# Patient Record
Sex: FEMALE | Race: WHITE | NOT HISPANIC OR LATINO
[De-identification: names, ages, dates, MRNs, and addresses within clinical notes are randomized per-mention and may not be internally consistent; named-entity substitution may affect disease eponyms.]

---

## 2017-03-20 ENCOUNTER — RESULT REVIEW (OUTPATIENT)
Age: 37
End: 2017-03-20

## 2017-03-21 ENCOUNTER — APPOINTMENT (OUTPATIENT)
Dept: DERMATOLOGY | Facility: CLINIC | Age: 37
End: 2017-03-21

## 2017-03-21 ENCOUNTER — LABORATORY RESULT (OUTPATIENT)
Age: 37
End: 2017-03-21

## 2017-03-21 VITALS — DIASTOLIC BLOOD PRESSURE: 70 MMHG | SYSTOLIC BLOOD PRESSURE: 102 MMHG

## 2017-03-28 ENCOUNTER — APPOINTMENT (OUTPATIENT)
Dept: DERMATOLOGY | Facility: CLINIC | Age: 37
End: 2017-03-28

## 2017-03-28 DIAGNOSIS — D48.5 NEOPLASM OF UNCERTAIN BEHAVIOR OF SKIN: ICD-10-CM

## 2017-03-28 DIAGNOSIS — Z48.02 ENCOUNTER FOR REMOVAL OF SUTURES: ICD-10-CM

## 2017-03-28 RX ORDER — FLUTICASONE PROPIONATE 50 UG/1
50 SPRAY, METERED NASAL
Qty: 16 | Refills: 0 | Status: ACTIVE | COMMUNITY
Start: 2016-11-27

## 2017-03-28 RX ORDER — AZITHROMYCIN 250 MG/1
250 TABLET, FILM COATED ORAL
Qty: 6 | Refills: 0 | Status: ACTIVE | COMMUNITY
Start: 2016-11-27

## 2017-04-03 ENCOUNTER — MESSAGE (OUTPATIENT)
Age: 37
End: 2017-04-03

## 2018-04-24 ENCOUNTER — RESULT REVIEW (OUTPATIENT)
Age: 38
End: 2018-04-24

## 2018-05-08 ENCOUNTER — APPOINTMENT (OUTPATIENT)
Dept: SURGERY | Facility: CLINIC | Age: 38
End: 2018-05-08
Payer: COMMERCIAL

## 2018-05-08 VITALS
BODY MASS INDEX: 27.32 KG/M2 | SYSTOLIC BLOOD PRESSURE: 117 MMHG | WEIGHT: 170 LBS | HEIGHT: 66 IN | DIASTOLIC BLOOD PRESSURE: 80 MMHG | HEART RATE: 66 BPM

## 2018-05-08 DIAGNOSIS — Z80.0 FAMILY HISTORY OF MALIGNANT NEOPLASM OF DIGESTIVE ORGANS: ICD-10-CM

## 2018-05-08 DIAGNOSIS — Z80.7 FAMILY HISTORY OF OTHER MALIGNANT NEOPLASMS OF LYMPHOID, HEMATOPOIETIC AND RELATED TISSUES: ICD-10-CM

## 2018-05-08 PROCEDURE — 99244 OFF/OP CNSLTJ NEW/EST MOD 40: CPT

## 2018-05-17 ENCOUNTER — OUTPATIENT (OUTPATIENT)
Dept: OUTPATIENT SERVICES | Facility: HOSPITAL | Age: 38
LOS: 1 days | End: 2018-05-17

## 2018-05-17 VITALS
TEMPERATURE: 98 F | SYSTOLIC BLOOD PRESSURE: 130 MMHG | RESPIRATION RATE: 16 BRPM | HEIGHT: 66 IN | HEART RATE: 68 BPM | WEIGHT: 179.9 LBS | DIASTOLIC BLOOD PRESSURE: 86 MMHG

## 2018-05-17 DIAGNOSIS — Z98.89 OTHER SPECIFIED POSTPROCEDURAL STATES: Chronic | ICD-10-CM

## 2018-05-17 DIAGNOSIS — O02.1 MISSED ABORTION: Chronic | ICD-10-CM

## 2018-05-17 DIAGNOSIS — C73 MALIGNANT NEOPLASM OF THYROID GLAND: ICD-10-CM

## 2018-05-17 DIAGNOSIS — E04.1 NONTOXIC SINGLE THYROID NODULE: ICD-10-CM

## 2018-05-17 LAB
BUN SERPL-MCNC: 12 MG/DL — SIGNIFICANT CHANGE UP (ref 7–23)
CALCIUM SERPL-MCNC: 9.5 MG/DL — SIGNIFICANT CHANGE UP (ref 8.4–10.5)
CHLORIDE SERPL-SCNC: 99 MMOL/L — SIGNIFICANT CHANGE UP (ref 98–107)
CO2 SERPL-SCNC: 28 MMOL/L — SIGNIFICANT CHANGE UP (ref 22–31)
CREAT SERPL-MCNC: 0.63 MG/DL — SIGNIFICANT CHANGE UP (ref 0.5–1.3)
GLUCOSE SERPL-MCNC: 89 MG/DL — SIGNIFICANT CHANGE UP (ref 70–99)
HCT VFR BLD CALC: 36.6 % — SIGNIFICANT CHANGE UP (ref 34.5–45)
HGB BLD-MCNC: 12.3 G/DL — SIGNIFICANT CHANGE UP (ref 11.5–15.5)
MCHC RBC-ENTMCNC: 30.1 PG — SIGNIFICANT CHANGE UP (ref 27–34)
MCHC RBC-ENTMCNC: 33.6 % — SIGNIFICANT CHANGE UP (ref 32–36)
MCV RBC AUTO: 89.7 FL — SIGNIFICANT CHANGE UP (ref 80–100)
NRBC # FLD: 0 — SIGNIFICANT CHANGE UP
PLATELET # BLD AUTO: 321 K/UL — SIGNIFICANT CHANGE UP (ref 150–400)
PMV BLD: 10.3 FL — SIGNIFICANT CHANGE UP (ref 7–13)
POTASSIUM SERPL-MCNC: 4.2 MMOL/L — SIGNIFICANT CHANGE UP (ref 3.5–5.3)
POTASSIUM SERPL-SCNC: 4.2 MMOL/L — SIGNIFICANT CHANGE UP (ref 3.5–5.3)
RBC # BLD: 4.08 M/UL — SIGNIFICANT CHANGE UP (ref 3.8–5.2)
RBC # FLD: 12.3 % — SIGNIFICANT CHANGE UP (ref 10.3–14.5)
SODIUM SERPL-SCNC: 139 MMOL/L — SIGNIFICANT CHANGE UP (ref 135–145)
WBC # BLD: 11.01 K/UL — HIGH (ref 3.8–10.5)
WBC # FLD AUTO: 11.01 K/UL — HIGH (ref 3.8–10.5)

## 2018-05-17 RX ORDER — SODIUM CHLORIDE 9 MG/ML
1000 INJECTION, SOLUTION INTRAVENOUS
Qty: 0 | Refills: 0 | Status: DISCONTINUED | OUTPATIENT
Start: 2018-05-22 | End: 2018-05-23

## 2018-05-17 NOTE — H&P PST ADULT - ASSESSMENT
37 yrs old female with h/o thyroid nodule that was being followed, but recently had another sono that showed 2 nodules that were calcified and so had biopsy that showed papillary thyroid cancer. Pt presents for preop eval to have total thyroidectomy, paratracheal node dissection on 5/22/18.

## 2018-05-17 NOTE — H&P PST ADULT - NEGATIVE NEUROLOGICAL SYMPTOMS
no paresthesias/no headache/no weakness/no generalized seizures/no difficulty walking/no transient paralysis

## 2018-05-17 NOTE — H&P PST ADULT - PSH
H/O ovarian cystectomy  many yrs ago, from right ovary  Missed    and so had D&C  S/P laparoscopic cholecystectomy   for gall stones

## 2018-05-17 NOTE — H&P PST ADULT - PROBLEM SELECTOR PLAN 1
Scheduled to have total thyroidectomy, paratracheal node dissection on 5/22/18.   labs pending.  Preop instructions provided to pt.  Famotidine and chlorhexidine scrubs provided to pt with instructions

## 2018-05-17 NOTE — H&P PST ADULT - NEGATIVE GENERAL GENITOURINARY SYMPTOMS
no bladder infections/no hematuria/no renal colic/no flank pain R/no urinary hesitancy/no flank pain L

## 2018-05-17 NOTE — H&P PST ADULT - ENDOCRINE COMMENTS
h/o thyroid nodule and biopsy showed papillary thryoid cancer h/o thyroid nodule and biopsy showed papillary Thyroid cancer

## 2018-05-17 NOTE — H&P PST ADULT - NSANTHOSAYNRD_GEN_A_CORE
No. KATIE screening performed.  STOP BANG Legend: 0-2 = LOW Risk; 3-4 = INTERMEDIATE Risk; 5-8 = HIGH Risk/never tested

## 2018-05-21 ENCOUNTER — TRANSCRIPTION ENCOUNTER (OUTPATIENT)
Age: 38
End: 2018-05-21

## 2018-05-22 ENCOUNTER — RESULT REVIEW (OUTPATIENT)
Age: 38
End: 2018-05-22

## 2018-05-22 ENCOUNTER — INPATIENT (INPATIENT)
Facility: HOSPITAL | Age: 38
LOS: 0 days | Discharge: HOME | End: 2018-05-23
Attending: SPECIALIST | Admitting: SPECIALIST
Payer: COMMERCIAL

## 2018-05-22 ENCOUNTER — APPOINTMENT (OUTPATIENT)
Dept: SURGERY | Facility: HOSPITAL | Age: 38
End: 2018-05-22

## 2018-05-22 ENCOUNTER — OTHER (OUTPATIENT)
Age: 38
End: 2018-05-22

## 2018-05-22 ENCOUNTER — TRANSCRIPTION ENCOUNTER (OUTPATIENT)
Age: 38
End: 2018-05-22

## 2018-05-22 VITALS
DIASTOLIC BLOOD PRESSURE: 75 MMHG | SYSTOLIC BLOOD PRESSURE: 115 MMHG | OXYGEN SATURATION: 96 % | HEART RATE: 72 BPM | WEIGHT: 179.9 LBS | TEMPERATURE: 99 F | HEIGHT: 66 IN | RESPIRATION RATE: 16 BRPM

## 2018-05-22 DIAGNOSIS — C73 MALIGNANT NEOPLASM OF THYROID GLAND: ICD-10-CM

## 2018-05-22 DIAGNOSIS — Z98.89 OTHER SPECIFIED POSTPROCEDURAL STATES: Chronic | ICD-10-CM

## 2018-05-22 DIAGNOSIS — O02.1 MISSED ABORTION: Chronic | ICD-10-CM

## 2018-05-22 LAB
CALCIUM SERPL-MCNC: 8.6 MG/DL — SIGNIFICANT CHANGE UP (ref 8.4–10.5)
CALCIUM SERPL-MCNC: 9.1 MG/DL — SIGNIFICANT CHANGE UP (ref 8.4–10.5)
HCG UR QL: NEGATIVE — SIGNIFICANT CHANGE UP

## 2018-05-22 PROCEDURE — 88307 TISSUE EXAM BY PATHOLOGIST: CPT | Mod: 26

## 2018-05-22 RX ORDER — CALCIUM CARBONATE 500(1250)
1 TABLET ORAL
Qty: 0 | Refills: 0 | Status: DISCONTINUED | OUTPATIENT
Start: 2018-05-22 | End: 2018-05-23

## 2018-05-22 RX ORDER — LEVOTHYROXINE SODIUM 125 MCG
1 TABLET ORAL
Qty: 30 | Refills: 2 | OUTPATIENT
Start: 2018-05-22 | End: 2018-08-19

## 2018-05-22 RX ORDER — MIDAZOLAM HYDROCHLORIDE 1 MG/ML
2 INJECTION, SOLUTION INTRAMUSCULAR; INTRAVENOUS ONCE
Qty: 0 | Refills: 0 | Status: DISCONTINUED | OUTPATIENT
Start: 2018-05-22 | End: 2018-05-22

## 2018-05-22 RX ORDER — ALBUTEROL 90 UG/1
2 AEROSOL, METERED ORAL
Qty: 0 | Refills: 0 | COMMUNITY

## 2018-05-22 RX ORDER — ALPRAZOLAM 0.25 MG
0.5 TABLET ORAL ONCE
Qty: 0 | Refills: 0 | Status: DISCONTINUED | OUTPATIENT
Start: 2018-05-22 | End: 2018-05-22

## 2018-05-22 RX ORDER — ONDANSETRON 8 MG/1
4 TABLET, FILM COATED ORAL ONCE
Qty: 0 | Refills: 0 | Status: COMPLETED | OUTPATIENT
Start: 2018-05-22 | End: 2018-05-22

## 2018-05-22 RX ORDER — ACETAMINOPHEN 500 MG
2 TABLET ORAL
Qty: 0 | Refills: 0 | COMMUNITY
Start: 2018-05-22

## 2018-05-22 RX ORDER — ALBUTEROL 90 UG/1
2 AEROSOL, METERED ORAL EVERY 6 HOURS
Qty: 0 | Refills: 0 | Status: DISCONTINUED | OUTPATIENT
Start: 2018-05-22 | End: 2018-05-23

## 2018-05-22 RX ORDER — CETIRIZINE HYDROCHLORIDE 10 MG/1
1 TABLET ORAL
Qty: 0 | Refills: 0 | COMMUNITY

## 2018-05-22 RX ORDER — ACETAMINOPHEN 500 MG
650 TABLET ORAL EVERY 6 HOURS
Qty: 0 | Refills: 0 | Status: DISCONTINUED | OUTPATIENT
Start: 2018-05-22 | End: 2018-05-23

## 2018-05-22 RX ORDER — FENTANYL CITRATE 50 UG/ML
25 INJECTION INTRAVENOUS
Qty: 0 | Refills: 0 | Status: DISCONTINUED | OUTPATIENT
Start: 2018-05-22 | End: 2018-05-22

## 2018-05-22 RX ORDER — LEVOTHYROXINE SODIUM 125 MCG
112 TABLET ORAL DAILY
Qty: 0 | Refills: 0 | Status: DISCONTINUED | OUTPATIENT
Start: 2018-05-22 | End: 2018-05-23

## 2018-05-22 RX ORDER — LORATADINE 10 MG/1
10 TABLET ORAL DAILY
Qty: 0 | Refills: 0 | Status: DISCONTINUED | OUTPATIENT
Start: 2018-05-22 | End: 2018-05-23

## 2018-05-22 RX ORDER — FENTANYL CITRATE 50 UG/ML
50 INJECTION INTRAVENOUS
Qty: 0 | Refills: 0 | Status: DISCONTINUED | OUTPATIENT
Start: 2018-05-22 | End: 2018-05-22

## 2018-05-22 RX ORDER — CALCIUM CARBONATE 500(1250)
1 TABLET ORAL
Qty: 0 | Refills: 0 | COMMUNITY
Start: 2018-05-22

## 2018-05-22 RX ORDER — CALCIUM GLUCONATE 100 MG/ML
1 VIAL (ML) INTRAVENOUS ONCE
Qty: 0 | Refills: 0 | Status: COMPLETED | OUTPATIENT
Start: 2018-05-22 | End: 2018-05-22

## 2018-05-22 RX ORDER — FLUTICASONE PROPIONATE 50 MCG
2 SPRAY, SUSPENSION NASAL
Qty: 0 | Refills: 0 | COMMUNITY

## 2018-05-22 RX ORDER — OXYCODONE AND ACETAMINOPHEN 5; 325 MG/1; MG/1
1 TABLET ORAL EVERY 4 HOURS
Qty: 0 | Refills: 0 | Status: DISCONTINUED | OUTPATIENT
Start: 2018-05-22 | End: 2018-05-23

## 2018-05-22 RX ADMIN — SODIUM CHLORIDE 50 MILLILITER(S): 9 INJECTION, SOLUTION INTRAVENOUS at 14:56

## 2018-05-22 RX ADMIN — FENTANYL CITRATE 25 MICROGRAM(S): 50 INJECTION INTRAVENOUS at 09:56

## 2018-05-22 RX ADMIN — OXYCODONE AND ACETAMINOPHEN 1 TABLET(S): 5; 325 TABLET ORAL at 19:43

## 2018-05-22 RX ADMIN — MIDAZOLAM HYDROCHLORIDE 2 MILLIGRAM(S): 1 INJECTION, SOLUTION INTRAMUSCULAR; INTRAVENOUS at 10:38

## 2018-05-22 RX ADMIN — Medication 0.5 MILLIGRAM(S): at 14:57

## 2018-05-22 RX ADMIN — LORATADINE 10 MILLIGRAM(S): 10 TABLET ORAL at 17:46

## 2018-05-22 RX ADMIN — FENTANYL CITRATE 25 MICROGRAM(S): 50 INJECTION INTRAVENOUS at 10:20

## 2018-05-22 RX ADMIN — FENTANYL CITRATE 25 MICROGRAM(S): 50 INJECTION INTRAVENOUS at 10:40

## 2018-05-22 RX ADMIN — FENTANYL CITRATE 25 MICROGRAM(S): 50 INJECTION INTRAVENOUS at 10:25

## 2018-05-22 RX ADMIN — FENTANYL CITRATE 25 MICROGRAM(S): 50 INJECTION INTRAVENOUS at 10:10

## 2018-05-22 RX ADMIN — OXYCODONE AND ACETAMINOPHEN 1 TABLET(S): 5; 325 TABLET ORAL at 19:18

## 2018-05-22 RX ADMIN — Medication 1 TABLET(S): at 17:46

## 2018-05-22 RX ADMIN — OXYCODONE AND ACETAMINOPHEN 1 TABLET(S): 5; 325 TABLET ORAL at 14:25

## 2018-05-22 RX ADMIN — Medication 200 GRAM(S): at 09:48

## 2018-05-22 RX ADMIN — Medication 1 TABLET(S): at 13:48

## 2018-05-22 RX ADMIN — OXYCODONE AND ACETAMINOPHEN 1 TABLET(S): 5; 325 TABLET ORAL at 14:55

## 2018-05-22 RX ADMIN — ONDANSETRON 4 MILLIGRAM(S): 8 TABLET, FILM COATED ORAL at 11:23

## 2018-05-22 RX ADMIN — SODIUM CHLORIDE 50 MILLILITER(S): 9 INJECTION, SOLUTION INTRAVENOUS at 09:50

## 2018-05-22 NOTE — CHART NOTE - NSCHARTNOTEFT_GEN_A_CORE
[facility] GENERAL SURGERY POST-OP NOTE    SUBJECTIVE: Pt seen and evaluated at bedside. Resting comfortably in bed. Pain controlled. Denies nausea/vomiting, CP, palpitations, SOB, lightheaded, dizziness. Does complain of perioral and distal finger tingling/numbness    Objective:  Gen: NAD, AAOx3  HEENT: sensation intact bilateral face, neck, negative chvostek sign bilaterally  Pulm: b/l chest rise. No work on breathing, slightly increased rate18  Card: normal rate, RR, no m/r/g  Abd: soft, appropriately tender, ND. Dressings CDI.  Extremities: reflexes normal responsiveness    Vital Signs Last 24 Hrs  T(C): 36.4 (22 May 2018 13:45), Max: 37 (22 May 2018 06:45)  T(F): 97.5 (22 May 2018 13:45), Max: 98.6 (22 May 2018 06:45)  HR: 81 (22 May 2018 13:45) (72 - 95)  BP: 142/87 (22 May 2018 13:45) (115/75 - 146/75)  BP(mean): --  RR: 16 (22 May 2018 13:45) (12 - 30)  SpO2: 100% (22 May 2018 13:45) (96% - 100%)  I&O's Summary    22 May 2018 07:01  -  22 May 2018 14:51  --------------------------------------------------------  IN: 340 mL / OUT: 0 mL / NET: 340 mL      I&O's Detail    22 May 2018 07:01  -  22 May 2018 14:51  --------------------------------------------------------  IN:    lactated ringers.: 100 mL    Oral Fluid: 240 mL  Total IN: 340 mL    OUT:  Total OUT: 0 mL    Total NET: 340 mL      MEDICATIONS  (STANDING):  ALPRAZolam 0.5 milliGRAM(s) Oral once  calcium carbonate 500 mG (Tums) Chewable 1 Tablet(s) Chew four times a day  lactated ringers. 1000 milliLiter(s) (50 mL/Hr) IV Continuous <Continuous>  levothyroxine 112 MICROGram(s) Oral daily  loratadine 10 milliGRAM(s) Oral daily    MEDICATIONS  (PRN):  acetaminophen   Tablet. 650 milliGRAM(s) Oral every 6 hours PRN Moderate Pain (4 - 6)  ALBUTerol    90 MICROgram(s) HFA Inhaler 2 Puff(s) Inhalation every 6 hours PRN Shortness of Breath and/or Wheezing  oxyCODONE    5 mG/acetaminophen 325 mG 1 Tablet(s) Oral every 4 hours PRN Severe Pain (7 - 10)      LABS:      Ca    9.1      22 May 2018 11:17            RADIOLOGY & ADDITIONAL STUDIES:      A/P: 37y Female s/p total thyroid, first Ca level 9.1 recheck in lab now    - 0.5 xanax one time (takes PRN at home)  - f/u calcium level  - Pain control  - Strict I/O's  - F/U GI fxn  - OOB/DVT ppx  - AM labs

## 2018-05-22 NOTE — DISCHARGE NOTE ADULT - HOSPITAL COURSE
5/22/18 - total thyroidectomy, paratracheal node dissection 5/22/18 - total thyroidectomy, paratracheal node dissection. Percocet ordered for pain, contains tylenol, ensure no more than 4000 mg of tylenol taken per day (no more than 975 every 6 hours)

## 2018-05-22 NOTE — DISCHARGE NOTE ADULT - CARE PLAN
Principal Discharge DX:	Thyroid nodule  Secondary Diagnosis:	Missed  Principal Discharge DX:	Thyroid nodule  Goal:	return to daily living activity prior to surgery, postoperative recovery  Assessment and plan of treatment:	Follow-up in office with Dr. Hogan for final pathology and for post-op check  Secondary Diagnosis:	Missed

## 2018-05-22 NOTE — DISCHARGE NOTE ADULT - CONDITIONS AT DISCHARGE
Alert & oriented. Out of bed ambulating. Tolerating diet without nausea or vomiting. Neck steris are dry & intact. Slight numbness in lips but patient states it may be her anxiety. Voiding without difficulty. Vitals stable, afebrile. Understands all discharge instruction & will follow up with the MD. Time allowed for questions. Alert & oriented. Out of bed ambulating. Tolerating diet without nausea or vomiting. Neck steris are dry & intact. JAZMIN drain removed by MD. Slight numbness in lips but patient states it may be her anxiety, calcium level 8.6. Voiding without difficulty. Vitals stable, afebrile. Understands all discharge instruction & will follow up with the MD. Time allowed for questions.

## 2018-05-22 NOTE — BRIEF OPERATIVE NOTE - PROCEDURE
<<-----Click on this checkbox to enter Procedure Total thyroidectomy with paraglandular node dissection  05/22/2018    Active  DARCYL

## 2018-05-22 NOTE — DISCHARGE NOTE ADULT - PATIENT PORTAL LINK FT
You can access the [facility] Patient Portal, offered by [facility], by registering with the following website: [email or web address]

## 2018-05-22 NOTE — DISCHARGE NOTE ADULT - CARE PROVIDER_API CALL
Paulie Hogan), Plastic Surgery; Surgery  [address] [place] [address]  Phone: [phone]  Fax: [phone]

## 2018-05-22 NOTE — DISCHARGE NOTE ADULT - INSTRUCTIONS
Watch for signs of infection; redness, swelling, fever, chills or heat, report such symptoms to the MD. Drink 6-8 glasses of fluids daily to promote hydration. No heavy lifting, pulling or pushing heavy objects. Follow up with primary & surgical MD. Report numbness or tingling in lips or fingertips to the MD. Do not remove neck dressing. Shower as directed by MD, do not scrub site, allow water to run over incision & pat dry. Do not apply any lotions, ointments or powders to incision. Watch for signs of infection; redness, swelling, fever, chills or heat, report such symptoms to the MD. Drink 6-8 glasses of fluids daily to promote hydration. No heavy lifting, pulling or pushing heavy objects. Follow up with primary & surgical MD. Report numbness or tingling in lips or fingertips to the MD. Do not remove neck dressing. Shower as directed by MD, do not scrub site, allow water to run over incision & pat dry. Do not apply any lotions, ointments or powders to incision. No driving if taking percocet, causes drowsiness & constipation.

## 2018-05-22 NOTE — DISCHARGE NOTE ADULT - MEDICATION SUMMARY - MEDICATIONS TO TAKE
I will START or STAY ON the medications listed below when I get home from the hospital:    acetaminophen 325 mg oral tablet  -- 2 tab(s) by mouth every 6 hours, As needed, Moderate Pain (4 - 6)  -- Indication: For Malignant neoplasm of thyroid gland    calcium carbonate 500 mg (200 mg elemental calcium) oral tablet, chewable  -- 1 tab(s) by mouth 4 times a day  -- Indication: For Malignant neoplasm of thyroid gland    ZyrTEC 10 mg oral tablet  -- 1 tab(s) by mouth once a day  -- Indication: For Malignant neoplasm of thyroid gland    ProAir HFA 90 mcg/inh inhalation aerosol  -- 2 puff(s) inhaled 4 times a day, As Needed  -- Indication: For Malignant neoplasm of thyroid gland    fluticasone 50 mcg/inh nasal spray  -- 2 spray(s) into nose once a day  -- Indication: For Malignant neoplasm of thyroid gland    levothyroxine 112 mcg (0.112 mg) oral tablet  -- 1 tab(s) by mouth once a day   -- It is very important that you take or use this exactly as directed.  Do not skip doses or discontinue unless directed by your doctor.  Medication should be taken with plenty of water.  Some non-prescription drugs may aggravate your condition.  Read all labels carefully.  If a warning appears, check with your doctor before taking.  Take medication on an empty stomach 1 hour before or 2 to 3 hours after a meal unless otherwise directed by your doctor.    -- Indication: For Malignant neoplasm of thyroid gland

## 2018-05-22 NOTE — DISCHARGE NOTE ADULT - PLAN OF CARE
return to daily living activity prior to surgery, postoperative recovery Follow-up in office with Dr. Hogan for final pathology and for post-op check

## 2018-05-23 VITALS
RESPIRATION RATE: 18 BRPM | SYSTOLIC BLOOD PRESSURE: 117 MMHG | HEART RATE: 70 BPM | DIASTOLIC BLOOD PRESSURE: 62 MMHG | OXYGEN SATURATION: 100 % | TEMPERATURE: 98 F

## 2018-05-23 RX ORDER — ALPRAZOLAM 0.25 MG
0.5 TABLET ORAL ONCE
Qty: 0 | Refills: 0 | Status: DISCONTINUED | OUTPATIENT
Start: 2018-05-23 | End: 2018-05-23

## 2018-05-23 RX ORDER — OXYCODONE AND ACETAMINOPHEN 5; 325 MG/1; MG/1
5-325 TABLET ORAL
Qty: 20 | Refills: 0 | Status: ACTIVE | COMMUNITY
Start: 2018-05-23 | End: 1900-01-01

## 2018-05-23 RX ADMIN — SODIUM CHLORIDE 50 MILLILITER(S): 9 INJECTION, SOLUTION INTRAVENOUS at 10:47

## 2018-05-23 RX ADMIN — OXYCODONE AND ACETAMINOPHEN 1 TABLET(S): 5; 325 TABLET ORAL at 12:11

## 2018-05-23 RX ADMIN — OXYCODONE AND ACETAMINOPHEN 1 TABLET(S): 5; 325 TABLET ORAL at 12:45

## 2018-05-23 RX ADMIN — Medication 1 TABLET(S): at 06:54

## 2018-05-23 RX ADMIN — Medication 112 MICROGRAM(S): at 06:54

## 2018-05-23 RX ADMIN — Medication 0.5 MILLIGRAM(S): at 10:47

## 2018-05-23 RX ADMIN — OXYCODONE AND ACETAMINOPHEN 1 TABLET(S): 5; 325 TABLET ORAL at 06:38

## 2018-05-23 RX ADMIN — Medication 1 TABLET(S): at 12:11

## 2018-05-23 RX ADMIN — Medication 1 TABLET(S): at 01:46

## 2018-05-23 RX ADMIN — OXYCODONE AND ACETAMINOPHEN 1 TABLET(S): 5; 325 TABLET ORAL at 06:50

## 2018-05-23 RX ADMIN — Medication 0.5 MILLIGRAM(S): at 01:46

## 2018-05-23 NOTE — PROGRESS NOTE ADULT - SUBJECTIVE AND OBJECTIVE BOX
1 day s/p total thyroidectomy. afebrile. no collections. calcium level stable. negative chvostek's sign. now tolerating adequate po. JAZMIN removed. discharge home. f/u in office

## 2018-05-23 NOTE — PROGRESS NOTE ADULT - ASSESSMENT
- Calcium wnl  - Pain control  - Strict I/O's  - F/U GI fxn  - OOB/DVT ppx  - AM labs  - JAZMIN drain likely remove prior to discharge  - Discharge to home today    [id number]

## 2018-05-23 NOTE — PROGRESS NOTE ADULT - SUBJECTIVE AND OBJECTIVE BOX
S: Patient doing well, tingling improved after xanax yesterday, one additional dose overnight; denies fevers, chills, nausea, emesis, chest pain, SOB.    O: Vital Signs Last 24 Hrs  T(C): 36.5 (23 May 2018 06:51), Max: 36.9 (22 May 2018 17:11)  T(F): 97.7 (23 May 2018 06:51), Max: 98.4 (22 May 2018 17:11)  HR: 64 (23 May 2018 06:51) (64 - 95)  BP: 135/87 (23 May 2018 06:51) (108/69 - 146/75)  BP(mean): --  RR: 18 (23 May 2018 06:51) (12 - 30)  SpO2: 100% (23 May 2018 06:51) (95% - 100%)  I&O's Detail    22 May 2018 07:01  -  23 May 2018 07:00  --------------------------------------------------------  IN:    lactated ringers.: 900 mL    Oral Fluid: 900 mL  Total IN: 1800 mL    OUT:    Bulb: 95 mL    Voided: 1625 mL  Total OUT: 1720 mL    Total NET: 80 mL    General: alert and oriented, NAD  HEENT: incision CDI  Resp: airway patent, respirations unlabored  CVS: regular rate and rhythm  Abdomen: soft, nontender, nondistended  Extremities: no edema  Skin: warm, dry, appropriate color      Ca    8.6      22 May 2018 15:00

## 2018-05-25 ENCOUNTER — EMERGENCY (EMERGENCY)
Facility: HOSPITAL | Age: 38
LOS: 1 days | Discharge: HOME | End: 2018-05-25
Attending: EMERGENCY MEDICINE | Admitting: EMERGENCY MEDICINE
Payer: COMMERCIAL

## 2018-05-25 ENCOUNTER — OTHER (OUTPATIENT)
Age: 38
End: 2018-05-25

## 2018-05-25 VITALS
OXYGEN SATURATION: 95 % | DIASTOLIC BLOOD PRESSURE: 65 MMHG | HEART RATE: 62 BPM | RESPIRATION RATE: 14 BRPM | SYSTOLIC BLOOD PRESSURE: 112 MMHG | TEMPERATURE: 97 F

## 2018-05-25 VITALS
HEART RATE: 69 BPM | OXYGEN SATURATION: 100 % | SYSTOLIC BLOOD PRESSURE: 126 MMHG | TEMPERATURE: 98 F | RESPIRATION RATE: 16 BRPM | DIASTOLIC BLOOD PRESSURE: 71 MMHG

## 2018-05-25 DIAGNOSIS — Z98.89 OTHER SPECIFIED POSTPROCEDURAL STATES: Chronic | ICD-10-CM

## 2018-05-25 DIAGNOSIS — O02.1 MISSED ABORTION: Chronic | ICD-10-CM

## 2018-05-25 LAB
ALBUMIN SERPL ELPH-MCNC: 4.2 G/DL — SIGNIFICANT CHANGE UP (ref 3.3–5)
ALP SERPL-CCNC: 160 U/L — HIGH (ref 40–120)
ALT FLD-CCNC: 439 U/L — HIGH (ref 4–33)
APTT BLD: 32.7 SEC — SIGNIFICANT CHANGE UP (ref 27.5–37.4)
AST SERPL-CCNC: 256 U/L — HIGH (ref 4–32)
BASE EXCESS BLDV CALC-SCNC: 5.9 MMOL/L — SIGNIFICANT CHANGE UP
BASE EXCESS BLDV CALC-SCNC: 6 MMOL/L — SIGNIFICANT CHANGE UP
BASOPHILS # BLD AUTO: 0.04 K/UL — SIGNIFICANT CHANGE UP (ref 0–0.2)
BASOPHILS NFR BLD AUTO: 0.3 % — SIGNIFICANT CHANGE UP (ref 0–2)
BILIRUB SERPL-MCNC: 0.4 MG/DL — SIGNIFICANT CHANGE UP (ref 0.2–1.2)
BLOOD GAS VENOUS - CREATININE: 0.56 MG/DL — SIGNIFICANT CHANGE UP (ref 0.5–1.3)
BLOOD GAS VENOUS - CREATININE: 0.62 MG/DL — SIGNIFICANT CHANGE UP (ref 0.5–1.3)
BUN SERPL-MCNC: 11 MG/DL — SIGNIFICANT CHANGE UP (ref 7–23)
BUN SERPL-MCNC: 9 MG/DL — SIGNIFICANT CHANGE UP (ref 7–23)
CA-I BLD-SCNC: 0.88 MMOL/L — LOW (ref 1.03–1.23)
CA-I BLD-SCNC: 0.93 MMOL/L — LOW (ref 1.03–1.23)
CALCIUM SERPL-MCNC: 7.2 MG/DL — LOW (ref 8.4–10.5)
CALCIUM SERPL-MCNC: 7.2 MG/DL — LOW (ref 8.4–10.5)
CHLORIDE BLDV-SCNC: 101 MMOL/L — SIGNIFICANT CHANGE UP (ref 96–108)
CHLORIDE BLDV-SCNC: 98 MMOL/L — SIGNIFICANT CHANGE UP (ref 96–108)
CHLORIDE SERPL-SCNC: 93 MMOL/L — LOW (ref 98–107)
CHLORIDE SERPL-SCNC: 99 MMOL/L — SIGNIFICANT CHANGE UP (ref 98–107)
CK SERPL-CCNC: 83 U/L — SIGNIFICANT CHANGE UP (ref 25–170)
CO2 SERPL-SCNC: 29 MMOL/L — SIGNIFICANT CHANGE UP (ref 22–31)
CO2 SERPL-SCNC: 29 MMOL/L — SIGNIFICANT CHANGE UP (ref 22–31)
CREAT SERPL-MCNC: 0.6 MG/DL — SIGNIFICANT CHANGE UP (ref 0.5–1.3)
CREAT SERPL-MCNC: 0.62 MG/DL — SIGNIFICANT CHANGE UP (ref 0.5–1.3)
EOSINOPHIL # BLD AUTO: 0.1 K/UL — SIGNIFICANT CHANGE UP (ref 0–0.5)
EOSINOPHIL NFR BLD AUTO: 0.8 % — SIGNIFICANT CHANGE UP (ref 0–6)
GAS PNL BLDV: 135 MMOL/L — LOW (ref 136–146)
GAS PNL BLDV: 137 MMOL/L — SIGNIFICANT CHANGE UP (ref 136–146)
GLUCOSE BLDV-MCNC: 102 — HIGH (ref 70–99)
GLUCOSE BLDV-MCNC: 118 — HIGH (ref 70–99)
GLUCOSE SERPL-MCNC: 102 MG/DL — HIGH (ref 70–99)
GLUCOSE SERPL-MCNC: 108 MG/DL — HIGH (ref 70–99)
HCO3 BLDV-SCNC: 28 MMOL/L — HIGH (ref 20–27)
HCO3 BLDV-SCNC: 28 MMOL/L — HIGH (ref 20–27)
HCT VFR BLD CALC: 36.4 % — SIGNIFICANT CHANGE UP (ref 34.5–45)
HCT VFR BLDV CALC: 38.9 % — SIGNIFICANT CHANGE UP (ref 34.5–45)
HCT VFR BLDV CALC: 40 % — SIGNIFICANT CHANGE UP (ref 34.5–45)
HGB BLD-MCNC: 12.4 G/DL — SIGNIFICANT CHANGE UP (ref 11.5–15.5)
HGB BLDV-MCNC: 12.6 G/DL — SIGNIFICANT CHANGE UP (ref 11.5–15.5)
HGB BLDV-MCNC: 13 G/DL — SIGNIFICANT CHANGE UP (ref 11.5–15.5)
IMM GRANULOCYTES # BLD AUTO: 0.05 # — SIGNIFICANT CHANGE UP
IMM GRANULOCYTES NFR BLD AUTO: 0.4 % — SIGNIFICANT CHANGE UP (ref 0–1.5)
INR BLD: 1.09 — SIGNIFICANT CHANGE UP (ref 0.88–1.17)
LACTATE BLDV-MCNC: 0.8 MMOL/L — SIGNIFICANT CHANGE UP (ref 0.5–2)
LACTATE BLDV-MCNC: 2.8 MMOL/L — HIGH (ref 0.5–2)
LYMPHOCYTES # BLD AUTO: 2.43 K/UL — SIGNIFICANT CHANGE UP (ref 1–3.3)
LYMPHOCYTES # BLD AUTO: 20 % — SIGNIFICANT CHANGE UP (ref 13–44)
MCHC RBC-ENTMCNC: 29.4 PG — SIGNIFICANT CHANGE UP (ref 27–34)
MCHC RBC-ENTMCNC: 34.1 % — SIGNIFICANT CHANGE UP (ref 32–36)
MCV RBC AUTO: 86.3 FL — SIGNIFICANT CHANGE UP (ref 80–100)
MONOCYTES # BLD AUTO: 0.59 K/UL — SIGNIFICANT CHANGE UP (ref 0–0.9)
MONOCYTES NFR BLD AUTO: 4.9 % — SIGNIFICANT CHANGE UP (ref 2–14)
NEUTROPHILS # BLD AUTO: 8.95 K/UL — HIGH (ref 1.8–7.4)
NEUTROPHILS NFR BLD AUTO: 73.6 % — SIGNIFICANT CHANGE UP (ref 43–77)
NRBC # FLD: 0 — SIGNIFICANT CHANGE UP
PCO2 BLDV: 46 MMHG — SIGNIFICANT CHANGE UP (ref 41–51)
PCO2 BLDV: 53 MMHG — HIGH (ref 41–51)
PH BLDV: 7.38 PH — SIGNIFICANT CHANGE UP (ref 7.32–7.43)
PH BLDV: 7.43 PH — SIGNIFICANT CHANGE UP (ref 7.32–7.43)
PLATELET # BLD AUTO: 319 K/UL — SIGNIFICANT CHANGE UP (ref 150–400)
PMV BLD: 10.2 FL — SIGNIFICANT CHANGE UP (ref 7–13)
PO2 BLDV: 26 MMHG — LOW (ref 35–40)
PO2 BLDV: 33 MMHG — LOW (ref 35–40)
POTASSIUM BLDV-SCNC: 3.8 MMOL/L — SIGNIFICANT CHANGE UP (ref 3.4–4.5)
POTASSIUM BLDV-SCNC: 4.1 MMOL/L — SIGNIFICANT CHANGE UP (ref 3.4–4.5)
POTASSIUM SERPL-MCNC: 4.2 MMOL/L — SIGNIFICANT CHANGE UP (ref 3.5–5.3)
POTASSIUM SERPL-MCNC: 4.6 MMOL/L — SIGNIFICANT CHANGE UP (ref 3.5–5.3)
POTASSIUM SERPL-SCNC: 4.2 MMOL/L — SIGNIFICANT CHANGE UP (ref 3.5–5.3)
POTASSIUM SERPL-SCNC: 4.6 MMOL/L — SIGNIFICANT CHANGE UP (ref 3.5–5.3)
PROT SERPL-MCNC: 6.9 G/DL — SIGNIFICANT CHANGE UP (ref 6–8.3)
PROTHROM AB SERPL-ACNC: 12.5 SEC — SIGNIFICANT CHANGE UP (ref 9.8–13.1)
RBC # BLD: 4.22 M/UL — SIGNIFICANT CHANGE UP (ref 3.8–5.2)
RBC # FLD: 12.1 % — SIGNIFICANT CHANGE UP (ref 10.3–14.5)
SAO2 % BLDV: 41.8 % — LOW (ref 60–85)
SAO2 % BLDV: 53.2 % — LOW (ref 60–85)
SODIUM SERPL-SCNC: 137 MMOL/L — SIGNIFICANT CHANGE UP (ref 135–145)
SODIUM SERPL-SCNC: 140 MMOL/L — SIGNIFICANT CHANGE UP (ref 135–145)
SURGICAL PATHOLOGY STUDY: SIGNIFICANT CHANGE UP
T3 SERPL-MCNC: 98.7 NG/DL — SIGNIFICANT CHANGE UP (ref 80–200)
T4 AB SER-ACNC: 8.22 UG/DL — SIGNIFICANT CHANGE UP (ref 5.1–13)
TSH SERPL-MCNC: 1.07 UIU/ML — SIGNIFICANT CHANGE UP (ref 0.27–4.2)
WBC # BLD: 12.16 K/UL — HIGH (ref 3.8–10.5)
WBC # FLD AUTO: 12.16 K/UL — HIGH (ref 3.8–10.5)

## 2018-05-25 PROCEDURE — 99219: CPT

## 2018-05-25 RX ORDER — ALPRAZOLAM 0.25 MG
0.25 TABLET ORAL ONCE
Qty: 0 | Refills: 0 | Status: DISCONTINUED | OUTPATIENT
Start: 2018-05-25 | End: 2018-05-25

## 2018-05-25 RX ORDER — CALCITRIOL 0.5 UG/1
1 CAPSULE ORAL
Qty: 28 | Refills: 0 | OUTPATIENT
Start: 2018-05-25 | End: 2018-06-21

## 2018-05-25 RX ORDER — CALCIUM CARBONATE 500(1250)
2 TABLET ORAL EVERY 6 HOURS
Qty: 0 | Refills: 0 | Status: DISCONTINUED | OUTPATIENT
Start: 2018-05-25 | End: 2018-05-25

## 2018-05-25 RX ORDER — CALCIUM GLUCONATE 100 MG/ML
1 VIAL (ML) INTRAVENOUS ONCE
Qty: 0 | Refills: 0 | Status: COMPLETED | OUTPATIENT
Start: 2018-05-25 | End: 2018-05-25

## 2018-05-25 RX ORDER — CALCIUM CARBONATE 500(1250)
2 TABLET ORAL
Qty: 0 | Refills: 0 | Status: DISCONTINUED | OUTPATIENT
Start: 2018-05-25 | End: 2018-05-25

## 2018-05-25 RX ORDER — SODIUM CHLORIDE 9 MG/ML
1000 INJECTION INTRAMUSCULAR; INTRAVENOUS; SUBCUTANEOUS ONCE
Qty: 0 | Refills: 0 | Status: COMPLETED | OUTPATIENT
Start: 2018-05-25 | End: 2018-05-25

## 2018-05-25 RX ORDER — OXYCODONE AND ACETAMINOPHEN 5; 325 MG/1; MG/1
1 TABLET ORAL ONCE
Qty: 0 | Refills: 0 | Status: DISCONTINUED | OUTPATIENT
Start: 2018-05-25 | End: 2018-05-25

## 2018-05-25 RX ORDER — CALCITRIOL 0.5 UG/1
0.25 CAPSULE ORAL DAILY
Qty: 0 | Refills: 0 | Status: DISCONTINUED | OUTPATIENT
Start: 2018-05-25 | End: 2018-05-25

## 2018-05-25 RX ORDER — CALCIUM CARBONATE 500(1250)
2 TABLET ORAL EVERY 8 HOURS
Qty: 0 | Refills: 0 | Status: DISCONTINUED | OUTPATIENT
Start: 2018-05-25 | End: 2018-05-29

## 2018-05-25 RX ORDER — CALCIUM GLUCONATE 100 MG/ML
2 VIAL (ML) INTRAVENOUS ONCE
Qty: 0 | Refills: 0 | Status: COMPLETED | OUTPATIENT
Start: 2018-05-25 | End: 2018-05-25

## 2018-05-25 RX ORDER — CALCIUM CARBONATE 500(1250)
1 TABLET ORAL ONCE
Qty: 0 | Refills: 0 | Status: COMPLETED | OUTPATIENT
Start: 2018-05-25 | End: 2018-05-25

## 2018-05-25 RX ORDER — CALCITRIOL 0.5 UG/1
0.5 CAPSULE ORAL EVERY 12 HOURS
Qty: 0 | Refills: 0 | Status: CANCELLED | OUTPATIENT
Start: 2019-04-24 | End: 2018-05-29

## 2018-05-25 RX ORDER — ONDANSETRON 8 MG/1
4 TABLET, FILM COATED ORAL ONCE
Qty: 0 | Refills: 0 | Status: COMPLETED | OUTPATIENT
Start: 2018-05-25 | End: 2018-05-25

## 2018-05-25 RX ADMIN — Medication 1 TABLET(S): at 18:38

## 2018-05-25 RX ADMIN — OXYCODONE AND ACETAMINOPHEN 1 TABLET(S): 5; 325 TABLET ORAL at 15:19

## 2018-05-25 RX ADMIN — CALCITRIOL 0.25 MICROGRAM(S): 0.5 CAPSULE ORAL at 12:20

## 2018-05-25 RX ADMIN — Medication 200 GRAM(S): at 11:00

## 2018-05-25 RX ADMIN — SODIUM CHLORIDE 1000 MILLILITER(S): 9 INJECTION INTRAMUSCULAR; INTRAVENOUS; SUBCUTANEOUS at 12:21

## 2018-05-25 RX ADMIN — OXYCODONE AND ACETAMINOPHEN 1 TABLET(S): 5; 325 TABLET ORAL at 16:40

## 2018-05-25 RX ADMIN — ONDANSETRON 4 MILLIGRAM(S): 8 TABLET, FILM COATED ORAL at 12:21

## 2018-05-25 RX ADMIN — Medication 0.25 MILLIGRAM(S): at 12:20

## 2018-05-25 RX ADMIN — Medication 200 GRAM(S): at 12:08

## 2018-05-25 NOTE — ED CDU PROVIDER DISPOSITION NOTE - CLINICAL COURSE
pt s/p thyroidectomy  3 days ago with hypocalcemia, in cdu for calcium monitoring/supplementation  as well as pain control. Pt states she feels better, able to eat and wishes to go home now (initially discharged but the discharge was reversed).   at bedside, will take pt home. pt already has calcium supplements at home. pt s/p thyroidectomy  3 days ago with hypocalcemia, in cdu for calcium monitoring/supplementation  as well as pain control. Pt states she feels better, able to eat and wishes to go home now (initially discharged but the discharge was reversed).   at bedside, will take pt home. pt already has calcium supplements at home.  DISCHARGE NOTE-SUGAR SANCHEZ MD   If patient remains alert and oriented to person, place, date and situation and is appropriate.  If the patient denies any new or worsening physical complaints. If the patient feels better and wants to go home and all results of testing can be discussed with patient at bedside and copies of results can be provided to patient for discharge.  Patient understands that they develop new or worsening symptoms, to return to the ER immediately.  If the patient's vital signs remain stable then the patient can be safely discharged.    Discharge to home.    -Sugar Sanchez MD

## 2018-05-25 NOTE — ED ADULT TRIAGE NOTE - CHIEF COMPLAINT QUOTE
Pt 3 days s/p thyroidectomy, c/o bilateral arm stiffness and facial numbness since last night. Pt endorses difficulty swallowing/breathing. Sating 99% on room air, breathing even and unlabored.

## 2018-05-25 NOTE — ED PROVIDER NOTE - MEDICAL DECISION MAKING DETAILS
37 YOF PMH thyroid CA s/p thyroidectomy 3 days ago p/w hand cramping since this morning.  VSS WNL.  DDX hypokalemia, electrolyte abnormality, metabolic dyscrasia.  Exam consistent with hypocalcemia.  Given findings will treat empirically, surgery consult to Dr. Hogan, EKG, complete metabolic and hematologic evaluation, treat symptomatically and reassess.

## 2018-05-25 NOTE — ED CDU PROVIDER DISPOSITION NOTE - ATTENDING CONTRIBUTION TO CARE
SUGAR SKELTON MD: Reviewed and agree with the HPI as documented below:   36 y/o F nonsmoker PMHx thyroid nodule, gallstones, s/p thyroidectomy 3 days ago p/w tingling, numbness and cramping today.  Pt noted gradual onset numbness to face and muscle cramps to bilateral forearms, hands, arms, chest wall this morning.  Pt notes not having similar symptoms before.  Pt denies any fevers, chills, palpitations, lightheadedness, headache, visual/auditory disturbances.  Pt notes she has been compliant with calcium supplement since surgery with Dr. Hogan.  In ED, pt found to have hypocalcemia with serum calcium of 7.2 mg/dL.  Dr. Hogan was notified who recommended supplemental calcium and calcitriol.  Pt sent to CDU for calcium repletion, reassessment, and repeat labs.  Presently, pt notes significant improvement in muscle cramps and numbness.    SUGAR SERRANO, COSTA NOTE:      GEN: Patient is awake and alert and in no acute distress.    HEENT: Normocephalic/atraumatic.  Airway patent.  No oropharyngeal edema.  No stridor.  Auricles are normal.  Neck supple.  Surgical site c/d/i.  Sclera are non-icteric/Conjunctiva are not injected. EOMI.  RESP: Lungs CTAB, no wheeze, no rhonchi,  no rales.    CV: Heart is regular rate and rhythm.    ABD: Abdomen is soft, not distended +BS.  Non-tender to palpation.  No rebound/no guarding.  MSK: Back is nontender, no CVAT.  Moving all 4 extremities.     NEURO: Neurologically grossly intact.     PSYCH: Psychiatrically normal mood and affect.  No apparent risk to self or others.     DR. SERRANO, ATTENDING MD:    I performed a face to face bedside interview with patient regarding history of present illness, review of symptoms and past medical history. I completed an independent physical exam.  I have discussed patient's plan of care with the team of health care providers.   I agree with note as stated above, having amended the EMR as needed to reflect my findings. I have discussed the assessment and plan of care.  This includes during the time I functioned as the attending physician for this patient.

## 2018-05-25 NOTE — ED PROVIDER NOTE - PHYSICAL EXAMINATION
Justice carpal spasm on exam.  +Chvostek's and +Trousseau sign Justice carpal spasm on exam.  +Chvostek's and +Trousseau sign  ATTENDING PHYSICAL EXAM DR. EVANGELISTA ***GEN - NAD; well appearing; A+O x3 ***HEAD - NC/AT ***EYES/NOSE - PERRL, EOMI, mucous membranes moist, no discharge ***THROAT: Oral cavity and pharynx normal. No inflammation, swelling, exudate, or lesions. Surgical site clean and dry, no drainage  ***NECK: Neck supple, non-tender without lymphadenopathy, no masses, no thyromegaly.   ***PULMONARY - CTA b/l, symmetric breath sounds. ***CARDIAC -s1s2, RRR, no M,G,R  ***ABDOMEN - +BS, ND, NT, soft, no guarding, no rebound, no masses   ***BACK - no CVA tenderness, Normal  spine ***EXTREMITIES - symmetric pulses, 2+ dp, capillary refill < 2 seconds, no clubbing, no cyanosis, no edema, spasm of b/l fingers, +chvostek's ***SKIN - no rash or bruising   ***NEUROLOGIC - alert

## 2018-05-25 NOTE — ED CDU PROVIDER INITIAL DAY NOTE - NONTENDER LOCATION
left lower quadrant/periumbilical/suprapubic/left costovertebral angle/right upper quadrant/right lower quadrant/left upper quadrant/umbilical/right costovertebral angle

## 2018-05-25 NOTE — ED ADULT NURSE NOTE - OBJECTIVE STATEMENT
Pt A+OX3 S/P thyroidectomy on Monday and D/C'd Tuesday.  C/O numbness and tingling in miguel angel hands and feet since last night and awoke with facial heaviness and contracted hands.  MD notified.  Labs obtained and sent as ordered.  #20g SL R AC placed.  L hand PIV by EMS.  EKG done.  CM placed.  Spouse at bedside.  Appears anxious

## 2018-05-25 NOTE — ED PROVIDER NOTE - OBJECTIVE STATEMENT
37 YOF PMH thyroid CA s/p thyroidectomy 3 days ago p/w hand cramping since this morning.  Associated facial and hand tingling and stiffness.  No chest pain, SOB, fevers, chills, or other Sx.  Pt sent home on oral calcium supplements, levothyroxine, and percocet.

## 2018-05-25 NOTE — ED CDU PROVIDER INITIAL DAY NOTE - MEDICAL DECISION MAKING DETAILS
Pt is a 36 y/o F nonsmoker PMHx thyroid nodule, gallstones, s/p thyroidectomy 3 days ago p/w tingling, numbness and cramping today -- hypocalcemia s/p thyroidectomy -- replete calcium, recheck calcium

## 2018-05-25 NOTE — ED CDU PROVIDER INITIAL DAY NOTE - PROGRESS NOTE DETAILS
Repeat calcium stable.  Pt notes complete resolution of symptoms.  Pt requesting to go home.  As per main ED team, Dr. Alaniz and Dr. Kuhn, Dr. Hogan had originally wanted pt discharged from ED on Calcitriol 0.25 mcg QDay.  Pt given strict return precautions.  Pt medically stable for discharge.  Pt to follow up with PMD or Dr. Hogan in next few days to have repeat labs.  Pt noted to have transaminitis.  Hepatitis panel sent off.  Pt still without any subjective abdominal pain; abdomen soft, nontender, no guarding, no rigidity.  Pt medically stable for discharge. Pt notes recurrence of mild tingling to face.  Will keep pt in CDU overnight to replete calcium and recheck in AM. DISCHARGE NOTE-SUGAR SANCHEZ MD   If patient remains alert and oriented to person, place, date and situation and is appropriate.  If the patient denies any new or worsening physical complaints. If the patient feels better and wants to go home and all results of testing can be discussed with patient at bedside and copies of results can be provided to patient for discharge.  Patient understands that they develop new or worsening symptoms, to return to the ER immediately.  If the patient's vital signs remain stable then the patient can be safely discharged.    Discharge to home.    -Sugar Sanchez MD

## 2018-05-25 NOTE — ED CDU PROVIDER INITIAL DAY NOTE - ATTENDING CONTRIBUTION TO CARE
SUGAR SKELTON MD: Reviewed and agree with the HPI as documented below:   38 y/o F nonsmoker PMHx thyroid nodule, gallstones, s/p thyroidectomy 3 days ago p/w tingling, numbness and cramping today.  Pt noted gradual onset numbness to face and muscle cramps to bilateral forearms, hands, arms, chest wall this morning.  Pt notes not having similar symptoms before.  Pt denies any fevers, chills, palpitations, lightheadedness, headache, visual/auditory disturbances.  Pt notes she has been compliant with calcium supplement since surgery with Dr. Hogan.  In ED, pt found to have hypocalcemia with serum calcium of 7.2 mg/dL.  Dr. Hogan was notified who recommended supplemental calcium and calcitriol.  Pt sent to CDU for calcium repletion, reassessment, and repeat labs.  Presently, pt notes significant improvement in muscle cramps and numbness.    SUGAR SERRANO, COSTA NOTE:      GEN: Patient is awake and alert and in no acute distress.    HEENT: Normocephalic/atraumatic.  Airway patent.  No oropharyngeal edema.  No stridor.  Auricles are normal.  Neck supple.  Sclera are non-icteric/Conjunctiva are not injected. EOMI.  RESP: Lungs CTAB, no wheeze, no rhonchi,  no rales.    CV: Heart is regular rate and rhythm.    ABD: Abdomen is soft, not distended +BS.  Non-tender to palpation.  No rebound/no guarding.  MSK: Back is nontender, no CVAT.  Moving all 4 extremities.     NEURO: Neurologically grossly intact.     PSYCH: Psychiatrically normal mood and affect.  No apparent risk to self or others.     DR. SERRANO, ATTENDING MD:    I performed a face to face bedside interview with patient regarding history of present illness, review of symptoms and past medical history. I completed an independent physical exam.  I have discussed patient's plan of care with the team of health care providers.   I agree with note as stated above, having amended the EMR as needed to reflect my findings. I have discussed the assessment and plan of care.  This includes during the time I functioned as the attending physician for this patient. SUGAR SKELTON MD: Reviewed and agree with the HPI as documented below:   38 y/o F nonsmoker PMHx thyroid nodule, gallstones, s/p thyroidectomy 3 days ago p/w tingling, numbness and cramping today.  Pt noted gradual onset numbness to face and muscle cramps to bilateral forearms, hands, arms, chest wall this morning.  Pt notes not having similar symptoms before.  Pt denies any fevers, chills, palpitations, lightheadedness, headache, visual/auditory disturbances.  Pt notes she has been compliant with calcium supplement since surgery with Dr. Hogan.  In ED, pt found to have hypocalcemia with serum calcium of 7.2 mg/dL.  Dr. Hogan was notified who recommended supplemental calcium and calcitriol.  Pt sent to CDU for calcium repletion, reassessment, and repeat labs.  Presently, pt notes significant improvement in muscle cramps and numbness.    SUGAR SERRANO, COSTA NOTE:      GEN: Patient is awake and alert and in no acute distress.    HEENT: Normocephalic/atraumatic.  Airway patent.  No oropharyngeal edema.  No stridor.  Auricles are normal.  Neck supple.  Surgical site c/d/i.  Sclera are non-icteric/Conjunctiva are not injected. EOMI.  RESP: Lungs CTAB, no wheeze, no rhonchi,  no rales.    CV: Heart is regular rate and rhythm.    ABD: Abdomen is soft, not distended +BS.  Non-tender to palpation.  No rebound/no guarding.  MSK: Back is nontender, no CVAT.  Moving all 4 extremities.     NEURO: Neurologically grossly intact.     PSYCH: Psychiatrically normal mood and affect.  No apparent risk to self or others.     DR. SERRANO, ATTENDING MD:    I performed a face to face bedside interview with patient regarding history of present illness, review of symptoms and past medical history. I completed an independent physical exam.  I have discussed patient's plan of care with the team of health care providers.   I agree with note as stated above, having amended the EMR as needed to reflect my findings. I have discussed the assessment and plan of care.  This includes during the time I functioned as the attending physician for this patient.

## 2018-05-26 LAB
HAV IGM SER-ACNC: NONREACTIVE — SIGNIFICANT CHANGE UP
HBV CORE IGM SER-ACNC: NONREACTIVE — SIGNIFICANT CHANGE UP
HBV SURFACE AG SER-ACNC: NONREACTIVE — SIGNIFICANT CHANGE UP
HCV AB S/CO SERPL IA: 0.1 S/CO — SIGNIFICANT CHANGE UP
HCV AB SERPL-IMP: SIGNIFICANT CHANGE UP

## 2018-05-29 ENCOUNTER — RESULT REVIEW (OUTPATIENT)
Age: 38
End: 2018-05-29

## 2018-05-29 ENCOUNTER — APPOINTMENT (OUTPATIENT)
Dept: SURGERY | Facility: CLINIC | Age: 38
End: 2018-05-29
Payer: COMMERCIAL

## 2018-05-29 LAB
CALCIUM SERPL-MCNC: 10 MG/DL
CALCIUM SERPL-MCNC: 10 MG/DL
PARATHYROID HORMONE INTACT: 4 PG/ML

## 2018-05-29 PROCEDURE — 99024 POSTOP FOLLOW-UP VISIT: CPT

## 2018-05-29 PROCEDURE — 36415 COLL VENOUS BLD VENIPUNCTURE: CPT

## 2018-05-31 ENCOUNTER — RESULT REVIEW (OUTPATIENT)
Age: 38
End: 2018-05-31

## 2018-05-31 LAB — 25(OH)D3 SERPL-MCNC: 21.5 NG/ML

## 2018-06-05 ENCOUNTER — OTHER (OUTPATIENT)
Age: 38
End: 2018-06-05

## 2018-07-10 ENCOUNTER — APPOINTMENT (OUTPATIENT)
Dept: SURGERY | Facility: CLINIC | Age: 38
End: 2018-07-10
Payer: COMMERCIAL

## 2018-07-10 PROCEDURE — 99024 POSTOP FOLLOW-UP VISIT: CPT

## 2018-07-11 ENCOUNTER — OUTPATIENT (OUTPATIENT)
Dept: OUTPATIENT SERVICES | Facility: HOSPITAL | Age: 38
LOS: 1 days | End: 2018-07-11

## 2018-07-11 ENCOUNTER — APPOINTMENT (OUTPATIENT)
Dept: NUCLEAR MEDICINE | Facility: HOSPITAL | Age: 38
End: 2018-07-11
Payer: COMMERCIAL

## 2018-07-11 ENCOUNTER — APPOINTMENT (OUTPATIENT)
Dept: NUCLEAR MEDICINE | Facility: HOSPITAL | Age: 38
End: 2018-07-11

## 2018-07-11 DIAGNOSIS — Z98.89 OTHER SPECIFIED POSTPROCEDURAL STATES: Chronic | ICD-10-CM

## 2018-07-11 DIAGNOSIS — C73 MALIGNANT NEOPLASM OF THYROID GLAND: ICD-10-CM

## 2018-07-11 DIAGNOSIS — O02.1 MISSED ABORTION: Chronic | ICD-10-CM

## 2018-07-11 LAB — HCG SERPL-ACNC: < 5 MIU/ML — SIGNIFICANT CHANGE UP

## 2018-07-11 PROCEDURE — 96372 THER/PROPH/DIAG INJ SC/IM: CPT

## 2018-07-12 ENCOUNTER — APPOINTMENT (OUTPATIENT)
Dept: NUCLEAR MEDICINE | Facility: HOSPITAL | Age: 38
End: 2018-07-12

## 2018-07-12 PROCEDURE — 96372 THER/PROPH/DIAG INJ SC/IM: CPT

## 2018-07-13 ENCOUNTER — APPOINTMENT (OUTPATIENT)
Dept: NUCLEAR MEDICINE | Facility: HOSPITAL | Age: 38
End: 2018-07-13

## 2018-07-13 PROCEDURE — 79005 NUCLEAR RX ORAL ADMIN: CPT | Mod: 26

## 2018-07-15 ENCOUNTER — APPOINTMENT (OUTPATIENT)
Dept: NUCLEAR MEDICINE | Facility: HOSPITAL | Age: 38
End: 2018-07-15

## 2018-07-17 PROBLEM — O02.1 MISSED ABORTION: Chronic | Status: ACTIVE | Noted: 2018-05-17

## 2018-07-17 PROBLEM — K80.20 CALCULUS OF GALLBLADDER WITHOUT CHOLECYSTITIS WITHOUT OBSTRUCTION: Chronic | Status: ACTIVE | Noted: 2018-05-17

## 2018-07-17 PROBLEM — E04.1 NONTOXIC SINGLE THYROID NODULE: Chronic | Status: ACTIVE | Noted: 2018-05-17

## 2018-07-20 ENCOUNTER — APPOINTMENT (OUTPATIENT)
Dept: NUCLEAR MEDICINE | Facility: HOSPITAL | Age: 38
End: 2018-07-20
Payer: COMMERCIAL

## 2018-07-20 ENCOUNTER — OUTPATIENT (OUTPATIENT)
Dept: OUTPATIENT SERVICES | Facility: HOSPITAL | Age: 38
LOS: 1 days | End: 2018-07-20

## 2018-07-20 DIAGNOSIS — C73 MALIGNANT NEOPLASM OF THYROID GLAND: ICD-10-CM

## 2018-07-20 DIAGNOSIS — Z98.89 OTHER SPECIFIED POSTPROCEDURAL STATES: Chronic | ICD-10-CM

## 2018-07-20 DIAGNOSIS — O02.1 MISSED ABORTION: Chronic | ICD-10-CM

## 2018-07-20 PROCEDURE — 78018 THYROID MET IMAGING BODY: CPT | Mod: 26

## 2018-11-29 ENCOUNTER — LABORATORY RESULT (OUTPATIENT)
Age: 38
End: 2018-11-29

## 2018-11-29 ENCOUNTER — APPOINTMENT (OUTPATIENT)
Dept: SURGERY | Facility: CLINIC | Age: 38
End: 2018-11-29
Payer: COMMERCIAL

## 2018-11-29 DIAGNOSIS — C73 MALIGNANT NEOPLASM OF THYROID GLAND: ICD-10-CM

## 2018-11-29 PROCEDURE — 99213 OFFICE O/P EST LOW 20 MIN: CPT

## 2018-11-29 PROCEDURE — 36415 COLL VENOUS BLD VENIPUNCTURE: CPT

## 2018-12-02 ENCOUNTER — RESULT REVIEW (OUTPATIENT)
Age: 38
End: 2018-12-02

## 2018-12-02 LAB
T3 SERPL-MCNC: 101 NG/DL
T4 FREE SERPL-MCNC: 1.3 NG/DL
THYROGLOB AB SERPL-ACNC: 210 IU/ML
THYROGLOB SERPL-MCNC: 0.52 NG/ML
TSH SERPL-ACNC: 0.42 UIU/ML

## 2018-12-04 ENCOUNTER — RESULT REVIEW (OUTPATIENT)
Age: 38
End: 2018-12-04

## 2018-12-08 NOTE — ASU PREOP CHECKLIST - WEIGHT IN LBS
Nursing Note by Migdalia Oneill CMA at 04/05/17 10:44 AM     Author:  Migdalia Oneill CMA Service:  (none) Author Type:  Certified Medical Assistant     Filed:  04/05/17 10:44 AM Encounter Date:  4/5/2017 Status:  Signed     :  Migdalia Oneill CMA (Certified Medical Assistant)            If provider orders tests or biopsy at today's visit, patient would like to be contacted with results at [phone] (home) and it is[DJ1.1T] ok[DJ1.1M] to leave a detailed message on voice mail or with family member.  If medications are ordered at today's visit, the pharmacy name/location patient would like them to be sent to is [facility]  [address]DJ1.1T]           Revision History        User Key Date/Time User Provider Type Action    > DJ1.1 04/05/17 10:44 AM Migdalia Oneill CMA Certified Medical Assistant Sign    M - Manual, T - Template             179.8

## 2018-12-28 NOTE — H&P PST ADULT - RS GEN PE MLT RESP DETAILS PC
13 Hour(s) 48 Minute(s)
no rhonchi/no rales/clear to auscultation bilaterally/respirations non-labored/airway patent/no wheezes

## 2019-05-30 ENCOUNTER — APPOINTMENT (OUTPATIENT)
Dept: SURGERY | Facility: CLINIC | Age: 39
End: 2019-05-30

## 2019-11-18 ENCOUNTER — OUTPATIENT (OUTPATIENT)
Dept: OUTPATIENT SERVICES | Facility: HOSPITAL | Age: 39
LOS: 1 days | End: 2019-11-18

## 2019-11-18 ENCOUNTER — APPOINTMENT (OUTPATIENT)
Dept: NUCLEAR MEDICINE | Facility: HOSPITAL | Age: 39
End: 2019-11-18
Payer: COMMERCIAL

## 2019-11-18 DIAGNOSIS — C73 MALIGNANT NEOPLASM OF THYROID GLAND: ICD-10-CM

## 2019-11-18 DIAGNOSIS — O02.1 MISSED ABORTION: Chronic | ICD-10-CM

## 2019-11-18 DIAGNOSIS — Z98.89 OTHER SPECIFIED POSTPROCEDURAL STATES: Chronic | ICD-10-CM

## 2019-11-18 LAB — HCG SERPL-ACNC: < 5 MIU/ML — SIGNIFICANT CHANGE UP

## 2019-11-19 ENCOUNTER — APPOINTMENT (OUTPATIENT)
Dept: NUCLEAR MEDICINE | Facility: HOSPITAL | Age: 39
End: 2019-11-19

## 2019-11-20 ENCOUNTER — APPOINTMENT (OUTPATIENT)
Dept: NUCLEAR MEDICINE | Facility: HOSPITAL | Age: 39
End: 2019-11-20

## 2019-11-21 ENCOUNTER — APPOINTMENT (OUTPATIENT)
Dept: NUCLEAR MEDICINE | Facility: HOSPITAL | Age: 39
End: 2019-11-21

## 2019-11-22 ENCOUNTER — APPOINTMENT (OUTPATIENT)
Dept: NUCLEAR MEDICINE | Facility: HOSPITAL | Age: 39
End: 2019-11-22

## 2019-11-22 PROCEDURE — 78016 THYROID MET IMAGING/STUDIES: CPT | Mod: 26

## 2019-11-22 PROCEDURE — 78018 THYROID MET IMAGING BODY: CPT | Mod: 26

## 2019-11-22 PROCEDURE — 78020 THYROID MET UPTAKE: CPT | Mod: 26

## 2019-11-23 ENCOUNTER — APPOINTMENT (OUTPATIENT)
Dept: NUCLEAR MEDICINE | Facility: HOSPITAL | Age: 39
End: 2019-11-23

## 2019-11-24 ENCOUNTER — APPOINTMENT (OUTPATIENT)
Dept: NUCLEAR MEDICINE | Facility: HOSPITAL | Age: 39
End: 2019-11-24

## 2020-10-02 ENCOUNTER — TRANSCRIPTION ENCOUNTER (OUTPATIENT)
Age: 40
End: 2020-10-02

## 2020-10-02 ENCOUNTER — EMERGENCY (EMERGENCY)
Facility: HOSPITAL | Age: 40
LOS: 1 days | Discharge: HOME | End: 2020-10-02
Attending: EMERGENCY MEDICINE | Admitting: EMERGENCY MEDICINE
Payer: COMMERCIAL

## 2020-10-02 VITALS
RESPIRATION RATE: 18 BRPM | HEIGHT: 66 IN | DIASTOLIC BLOOD PRESSURE: 71 MMHG | OXYGEN SATURATION: 100 % | HEART RATE: 75 BPM | SYSTOLIC BLOOD PRESSURE: 112 MMHG | TEMPERATURE: 97 F

## 2020-10-02 VITALS
DIASTOLIC BLOOD PRESSURE: 77 MMHG | OXYGEN SATURATION: 99 % | SYSTOLIC BLOOD PRESSURE: 139 MMHG | RESPIRATION RATE: 18 BRPM | HEART RATE: 64 BPM | TEMPERATURE: 98 F

## 2020-10-02 DIAGNOSIS — Z98.89 OTHER SPECIFIED POSTPROCEDURAL STATES: Chronic | ICD-10-CM

## 2020-10-02 DIAGNOSIS — O02.1 MISSED ABORTION: Chronic | ICD-10-CM

## 2020-10-02 LAB
ALBUMIN SERPL ELPH-MCNC: 4 G/DL — SIGNIFICANT CHANGE UP (ref 3.3–5)
ALBUMIN SERPL ELPH-MCNC: 4.1 G/DL — SIGNIFICANT CHANGE UP (ref 3.3–5)
ALBUMIN SERPL ELPH-MCNC: 4.6 G/DL — SIGNIFICANT CHANGE UP (ref 3.3–5)
ALP SERPL-CCNC: 107 U/L — SIGNIFICANT CHANGE UP (ref 40–120)
ALP SERPL-CCNC: 78 U/L — SIGNIFICANT CHANGE UP (ref 40–120)
ALP SERPL-CCNC: 95 U/L — SIGNIFICANT CHANGE UP (ref 40–120)
ALT FLD-CCNC: 312 U/L — HIGH (ref 4–33)
ALT FLD-CCNC: 358 U/L — HIGH (ref 4–33)
ALT FLD-CCNC: 37 U/L — HIGH (ref 4–33)
ANION GAP SERPL CALC-SCNC: 11 MMO/L — SIGNIFICANT CHANGE UP (ref 7–14)
ANION GAP SERPL CALC-SCNC: 12 MMO/L — SIGNIFICANT CHANGE UP (ref 7–14)
ANION GAP SERPL CALC-SCNC: 13 MMO/L — SIGNIFICANT CHANGE UP (ref 7–14)
APPEARANCE UR: SIGNIFICANT CHANGE UP
APTT BLD: 24.9 SEC — LOW (ref 27–36.3)
AST SERPL-CCNC: 353 U/L — HIGH (ref 4–32)
AST SERPL-CCNC: 408 U/L — HIGH (ref 4–32)
AST SERPL-CCNC: 56 U/L — HIGH (ref 4–32)
BACTERIA # UR AUTO: SIGNIFICANT CHANGE UP
BASOPHILS # BLD AUTO: 0.03 K/UL — SIGNIFICANT CHANGE UP (ref 0–0.2)
BASOPHILS # BLD AUTO: 0.05 K/UL — SIGNIFICANT CHANGE UP (ref 0–0.2)
BASOPHILS NFR BLD AUTO: 0.3 % — SIGNIFICANT CHANGE UP (ref 0–2)
BASOPHILS NFR BLD AUTO: 0.3 % — SIGNIFICANT CHANGE UP (ref 0–2)
BILIRUB SERPL-MCNC: 0.5 MG/DL — SIGNIFICANT CHANGE UP (ref 0.2–1.2)
BILIRUB SERPL-MCNC: 0.7 MG/DL — SIGNIFICANT CHANGE UP (ref 0.2–1.2)
BILIRUB SERPL-MCNC: 0.9 MG/DL — SIGNIFICANT CHANGE UP (ref 0.2–1.2)
BILIRUB UR-MCNC: NEGATIVE — SIGNIFICANT CHANGE UP
BLOOD UR QL VISUAL: NEGATIVE — SIGNIFICANT CHANGE UP
BUN SERPL-MCNC: 16 MG/DL — SIGNIFICANT CHANGE UP (ref 7–23)
BUN SERPL-MCNC: 9 MG/DL — SIGNIFICANT CHANGE UP (ref 7–23)
BUN SERPL-MCNC: 9 MG/DL — SIGNIFICANT CHANGE UP (ref 7–23)
CALCIUM SERPL-MCNC: 8.3 MG/DL — LOW (ref 8.4–10.5)
CALCIUM SERPL-MCNC: 8.6 MG/DL — SIGNIFICANT CHANGE UP (ref 8.4–10.5)
CALCIUM SERPL-MCNC: 9.4 MG/DL — SIGNIFICANT CHANGE UP (ref 8.4–10.5)
CHLORIDE SERPL-SCNC: 100 MMOL/L — SIGNIFICANT CHANGE UP (ref 98–107)
CHLORIDE SERPL-SCNC: 102 MMOL/L — SIGNIFICANT CHANGE UP (ref 98–107)
CHLORIDE SERPL-SCNC: 102 MMOL/L — SIGNIFICANT CHANGE UP (ref 98–107)
CO2 SERPL-SCNC: 23 MMOL/L — SIGNIFICANT CHANGE UP (ref 22–31)
CO2 SERPL-SCNC: 23 MMOL/L — SIGNIFICANT CHANGE UP (ref 22–31)
CO2 SERPL-SCNC: 25 MMOL/L — SIGNIFICANT CHANGE UP (ref 22–31)
COLOR SPEC: YELLOW — SIGNIFICANT CHANGE UP
CREAT SERPL-MCNC: 0.54 MG/DL — SIGNIFICANT CHANGE UP (ref 0.5–1.3)
CREAT SERPL-MCNC: 0.58 MG/DL — SIGNIFICANT CHANGE UP (ref 0.5–1.3)
CREAT SERPL-MCNC: 0.62 MG/DL — SIGNIFICANT CHANGE UP (ref 0.5–1.3)
EOSINOPHIL # BLD AUTO: 0.06 K/UL — SIGNIFICANT CHANGE UP (ref 0–0.5)
EOSINOPHIL # BLD AUTO: 0.08 K/UL — SIGNIFICANT CHANGE UP (ref 0–0.5)
EOSINOPHIL NFR BLD AUTO: 0.4 % — SIGNIFICANT CHANGE UP (ref 0–6)
EOSINOPHIL NFR BLD AUTO: 0.9 % — SIGNIFICANT CHANGE UP (ref 0–6)
GLUCOSE SERPL-MCNC: 127 MG/DL — HIGH (ref 70–99)
GLUCOSE SERPL-MCNC: 94 MG/DL — SIGNIFICANT CHANGE UP (ref 70–99)
GLUCOSE SERPL-MCNC: 95 MG/DL — SIGNIFICANT CHANGE UP (ref 70–99)
GLUCOSE UR-MCNC: NEGATIVE — SIGNIFICANT CHANGE UP
HCG UR-SCNC: NEGATIVE — SIGNIFICANT CHANGE UP
HCT VFR BLD CALC: 34.5 % — SIGNIFICANT CHANGE UP (ref 34.5–45)
HCT VFR BLD CALC: 39.2 % — SIGNIFICANT CHANGE UP (ref 34.5–45)
HGB BLD-MCNC: 11.5 G/DL — SIGNIFICANT CHANGE UP (ref 11.5–15.5)
HGB BLD-MCNC: 12.8 G/DL — SIGNIFICANT CHANGE UP (ref 11.5–15.5)
HYALINE CASTS # UR AUTO: HIGH
IMM GRANULOCYTES NFR BLD AUTO: 0.2 % — SIGNIFICANT CHANGE UP (ref 0–1.5)
IMM GRANULOCYTES NFR BLD AUTO: 0.4 % — SIGNIFICANT CHANGE UP (ref 0–1.5)
INR BLD: 1.14 — SIGNIFICANT CHANGE UP (ref 0.88–1.16)
KETONES UR-MCNC: SIGNIFICANT CHANGE UP
LEUKOCYTE ESTERASE UR-ACNC: SIGNIFICANT CHANGE UP
LIDOCAIN IGE QN: 30.5 U/L — SIGNIFICANT CHANGE UP (ref 7–60)
LYMPHOCYTES # BLD AUTO: 12 % — LOW (ref 13–44)
LYMPHOCYTES # BLD AUTO: 2.03 K/UL — SIGNIFICANT CHANGE UP (ref 1–3.3)
LYMPHOCYTES # BLD AUTO: 2.28 K/UL — SIGNIFICANT CHANGE UP (ref 1–3.3)
LYMPHOCYTES # BLD AUTO: 26.1 % — SIGNIFICANT CHANGE UP (ref 13–44)
MCHC RBC-ENTMCNC: 28.9 PG — SIGNIFICANT CHANGE UP (ref 27–34)
MCHC RBC-ENTMCNC: 29.4 PG — SIGNIFICANT CHANGE UP (ref 27–34)
MCHC RBC-ENTMCNC: 32.7 % — SIGNIFICANT CHANGE UP (ref 32–36)
MCHC RBC-ENTMCNC: 33.3 % — SIGNIFICANT CHANGE UP (ref 32–36)
MCV RBC AUTO: 86.7 FL — SIGNIFICANT CHANGE UP (ref 80–100)
MCV RBC AUTO: 90.1 FL — SIGNIFICANT CHANGE UP (ref 80–100)
MONOCYTES # BLD AUTO: 0.54 K/UL — SIGNIFICANT CHANGE UP (ref 0–0.9)
MONOCYTES # BLD AUTO: 0.86 K/UL — SIGNIFICANT CHANGE UP (ref 0–0.9)
MONOCYTES NFR BLD AUTO: 5.1 % — SIGNIFICANT CHANGE UP (ref 2–14)
MONOCYTES NFR BLD AUTO: 6.2 % — SIGNIFICANT CHANGE UP (ref 2–14)
NEUTROPHILS # BLD AUTO: 13.79 K/UL — HIGH (ref 1.8–7.4)
NEUTROPHILS # BLD AUTO: 5.78 K/UL — SIGNIFICANT CHANGE UP (ref 1.8–7.4)
NEUTROPHILS NFR BLD AUTO: 66.3 % — SIGNIFICANT CHANGE UP (ref 43–77)
NEUTROPHILS NFR BLD AUTO: 81.8 % — HIGH (ref 43–77)
NITRITE UR-MCNC: NEGATIVE — SIGNIFICANT CHANGE UP
NRBC # FLD: 0 K/UL — SIGNIFICANT CHANGE UP (ref 0–0)
NRBC # FLD: 0 K/UL — SIGNIFICANT CHANGE UP (ref 0–0)
PH UR: 6 — SIGNIFICANT CHANGE UP (ref 5–8)
PLATELET # BLD AUTO: 258 K/UL — SIGNIFICANT CHANGE UP (ref 150–400)
PLATELET # BLD AUTO: 294 K/UL — SIGNIFICANT CHANGE UP (ref 150–400)
PMV BLD: 10.3 FL — SIGNIFICANT CHANGE UP (ref 7–13)
PMV BLD: 9.9 FL — SIGNIFICANT CHANGE UP (ref 7–13)
POTASSIUM SERPL-MCNC: 3.6 MMOL/L — SIGNIFICANT CHANGE UP (ref 3.5–5.3)
POTASSIUM SERPL-MCNC: 3.8 MMOL/L — SIGNIFICANT CHANGE UP (ref 3.5–5.3)
POTASSIUM SERPL-MCNC: 3.9 MMOL/L — SIGNIFICANT CHANGE UP (ref 3.5–5.3)
POTASSIUM SERPL-SCNC: 3.6 MMOL/L — SIGNIFICANT CHANGE UP (ref 3.5–5.3)
POTASSIUM SERPL-SCNC: 3.8 MMOL/L — SIGNIFICANT CHANGE UP (ref 3.5–5.3)
POTASSIUM SERPL-SCNC: 3.9 MMOL/L — SIGNIFICANT CHANGE UP (ref 3.5–5.3)
PROT SERPL-MCNC: 6 G/DL — SIGNIFICANT CHANGE UP (ref 6–8.3)
PROT SERPL-MCNC: 6.4 G/DL — SIGNIFICANT CHANGE UP (ref 6–8.3)
PROT SERPL-MCNC: 7.2 G/DL — SIGNIFICANT CHANGE UP (ref 6–8.3)
PROT UR-MCNC: 20 — SIGNIFICANT CHANGE UP
PROTHROM AB SERPL-ACNC: 12.9 SEC — SIGNIFICANT CHANGE UP (ref 10.6–13.6)
RBC # BLD: 3.98 M/UL — SIGNIFICANT CHANGE UP (ref 3.8–5.2)
RBC # BLD: 4.35 M/UL — SIGNIFICANT CHANGE UP (ref 3.8–5.2)
RBC # FLD: 12.4 % — SIGNIFICANT CHANGE UP (ref 10.3–14.5)
RBC # FLD: 12.5 % — SIGNIFICANT CHANGE UP (ref 10.3–14.5)
RBC CASTS # UR COMP ASSIST: SIGNIFICANT CHANGE UP (ref 0–?)
SARS-COV-2 RNA SPEC QL NAA+PROBE: SIGNIFICANT CHANGE UP
SODIUM SERPL-SCNC: 136 MMOL/L — SIGNIFICANT CHANGE UP (ref 135–145)
SODIUM SERPL-SCNC: 137 MMOL/L — SIGNIFICANT CHANGE UP (ref 135–145)
SODIUM SERPL-SCNC: 138 MMOL/L — SIGNIFICANT CHANGE UP (ref 135–145)
SP GR SPEC: 1.02 — SIGNIFICANT CHANGE UP (ref 1–1.04)
SP GR UR: 1.02 — SIGNIFICANT CHANGE UP (ref 1–1.04)
SQUAMOUS # UR AUTO: SIGNIFICANT CHANGE UP
TROPONIN T, HIGH SENSITIVITY: < 6 NG/L — SIGNIFICANT CHANGE UP (ref ?–14)
TROPONIN T, HIGH SENSITIVITY: < 6 NG/L — SIGNIFICANT CHANGE UP (ref ?–14)
UROBILINOGEN FLD QL: NORMAL — SIGNIFICANT CHANGE UP
WBC # BLD: 16.85 K/UL — HIGH (ref 3.8–10.5)
WBC # BLD: 8.73 K/UL — SIGNIFICANT CHANGE UP (ref 3.8–10.5)
WBC # FLD AUTO: 16.85 K/UL — HIGH (ref 3.8–10.5)
WBC # FLD AUTO: 8.73 K/UL — SIGNIFICANT CHANGE UP (ref 3.8–10.5)
WBC UR QL: HIGH (ref 0–?)

## 2020-10-02 PROCEDURE — 71046 X-RAY EXAM CHEST 2 VIEWS: CPT | Mod: 26

## 2020-10-02 PROCEDURE — 76705 ECHO EXAM OF ABDOMEN: CPT | Mod: 26

## 2020-10-02 PROCEDURE — 99218: CPT

## 2020-10-02 PROCEDURE — 74177 CT ABD & PELVIS W/CONTRAST: CPT | Mod: 26

## 2020-10-02 RX ORDER — FAMOTIDINE 10 MG/ML
20 INJECTION INTRAVENOUS ONCE
Refills: 0 | Status: COMPLETED | OUTPATIENT
Start: 2020-10-02 | End: 2020-10-02

## 2020-10-02 RX ORDER — SIMETHICONE 80 MG/1
80 TABLET, CHEWABLE ORAL ONCE
Refills: 0 | Status: COMPLETED | OUTPATIENT
Start: 2020-10-02 | End: 2020-10-02

## 2020-10-02 RX ORDER — MORPHINE SULFATE 50 MG/1
6 CAPSULE, EXTENDED RELEASE ORAL ONCE
Refills: 0 | Status: DISCONTINUED | OUTPATIENT
Start: 2020-10-02 | End: 2020-10-02

## 2020-10-02 RX ORDER — ACETAMINOPHEN 500 MG
650 TABLET ORAL ONCE
Refills: 0 | Status: COMPLETED | OUTPATIENT
Start: 2020-10-02 | End: 2020-10-02

## 2020-10-02 RX ORDER — MORPHINE SULFATE 50 MG/1
4 CAPSULE, EXTENDED RELEASE ORAL ONCE
Refills: 0 | Status: DISCONTINUED | OUTPATIENT
Start: 2020-10-02 | End: 2020-10-02

## 2020-10-02 RX ORDER — LEVOTHYROXINE SODIUM 125 MCG
112 TABLET ORAL DAILY
Refills: 0 | Status: DISCONTINUED | OUTPATIENT
Start: 2020-10-02 | End: 2020-10-05

## 2020-10-02 RX ORDER — SODIUM CHLORIDE 9 MG/ML
1000 INJECTION INTRAMUSCULAR; INTRAVENOUS; SUBCUTANEOUS ONCE
Refills: 0 | Status: COMPLETED | OUTPATIENT
Start: 2020-10-02 | End: 2020-10-02

## 2020-10-02 RX ORDER — ONDANSETRON 8 MG/1
4 TABLET, FILM COATED ORAL ONCE
Refills: 0 | Status: COMPLETED | OUTPATIENT
Start: 2020-10-02 | End: 2020-10-02

## 2020-10-02 RX ORDER — PANTOPRAZOLE SODIUM 20 MG/1
40 TABLET, DELAYED RELEASE ORAL ONCE
Refills: 0 | Status: COMPLETED | OUTPATIENT
Start: 2020-10-02 | End: 2020-10-02

## 2020-10-02 RX ORDER — LIDOCAINE 4 G/100G
10 CREAM TOPICAL ONCE
Refills: 0 | Status: COMPLETED | OUTPATIENT
Start: 2020-10-02 | End: 2020-10-02

## 2020-10-02 RX ORDER — SUCRALFATE 1 G
1 TABLET ORAL ONCE
Refills: 0 | Status: COMPLETED | OUTPATIENT
Start: 2020-10-02 | End: 2020-10-02

## 2020-10-02 RX ADMIN — Medication 112 MICROGRAM(S): at 15:21

## 2020-10-02 RX ADMIN — SODIUM CHLORIDE 1000 MILLILITER(S): 9 INJECTION INTRAMUSCULAR; INTRAVENOUS; SUBCUTANEOUS at 03:53

## 2020-10-02 RX ADMIN — SIMETHICONE 80 MILLIGRAM(S): 80 TABLET, CHEWABLE ORAL at 07:39

## 2020-10-02 RX ADMIN — MORPHINE SULFATE 6 MILLIGRAM(S): 50 CAPSULE, EXTENDED RELEASE ORAL at 04:51

## 2020-10-02 RX ADMIN — FAMOTIDINE 20 MILLIGRAM(S): 10 INJECTION INTRAVENOUS at 02:28

## 2020-10-02 RX ADMIN — MORPHINE SULFATE 4 MILLIGRAM(S): 50 CAPSULE, EXTENDED RELEASE ORAL at 03:24

## 2020-10-02 RX ADMIN — PANTOPRAZOLE SODIUM 40 MILLIGRAM(S): 20 TABLET, DELAYED RELEASE ORAL at 08:50

## 2020-10-02 RX ADMIN — Medication 650 MILLIGRAM(S): at 07:39

## 2020-10-02 RX ADMIN — ONDANSETRON 4 MILLIGRAM(S): 8 TABLET, FILM COATED ORAL at 02:47

## 2020-10-02 RX ADMIN — MORPHINE SULFATE 6 MILLIGRAM(S): 50 CAPSULE, EXTENDED RELEASE ORAL at 04:39

## 2020-10-02 RX ADMIN — LIDOCAINE 10 MILLILITER(S): 4 CREAM TOPICAL at 02:28

## 2020-10-02 RX ADMIN — Medication 30 MILLILITER(S): at 02:27

## 2020-10-02 RX ADMIN — SODIUM CHLORIDE 1000 MILLILITER(S): 9 INJECTION INTRAMUSCULAR; INTRAVENOUS; SUBCUTANEOUS at 02:25

## 2020-10-02 RX ADMIN — Medication 1 GRAM(S): at 08:52

## 2020-10-02 RX ADMIN — MORPHINE SULFATE 4 MILLIGRAM(S): 50 CAPSULE, EXTENDED RELEASE ORAL at 03:42

## 2020-10-02 NOTE — ED ADULT NURSE REASSESSMENT NOTE - NS ED NURSE REASSESS COMMENT FT1
Assumed care of pt at 0830.  Pt sleeping easily arousable in NAD, pt medicated as per order.  Pt awaiting Covid result for CDU admission.  Pt aware of plan, will cont to monitor.

## 2020-10-02 NOTE — ED CDU PROVIDER DISPOSITION NOTE - CLINICAL COURSE
Curtis: Awoke w/ severe epig. pain and nausea. Now resolved. LFTs increased from initial to second set, and now decreasing slightly. Liver synthetic fxn intact. Suspect she passed a retained gallstone (had lap cholecystectomy 5-6 yrs ago) or had Sphincter of Oddi dysfunction. F/u GI.

## 2020-10-02 NOTE — ED PROVIDER NOTE - ATTENDING CONTRIBUTION TO CARE
HPI: 38 yo F with Past Medical History significant for thyroid CA s/p thyroidectomy on synthroid s/p cholecystectomy 6 years ago that presents with epigastric pain radiating up towards chest as well as midback associated with nausea and vomiting (non bloody) tonight. Went to bed while watching TV, woke up 2 hours prior to arrival with pressure like 10/10 epigastric pain radiating towards chest, felt heavieness, started having several bouts N/V, started being diaphoretic and left arm tingling.  concerned for MI so told pt to come to ED for eval. pt denies any abnormal ingestions, has been well otherwise, 5 year old son was sick with fever x 24 hours 4 days ago and now well, otherwise no other sick contacts. Social smoker, LMP 1 month ago, no urinary symptoms, headache, vision/hearing changes, jaw pain.  EXAM: NAD, heart RRR, lungs ctab, abd soft, neg murphys, neg mcburneys, no rebound/guarding, no CVAT, skin without rash, no BLE edema.   MDM: pt with thyroid CA in past s/p thyroidectomy in remission and GB removal that presents with sudden onset epigastric pain radiating up towards chest with multiple bouts N/V but no diarrhea concerned for MI. Pt appears well now and still slightly nauseas but no longer vomiting, benign abd exam. Concern for viral gastro vs pancreatitis vs PUD/gastritis. Unlikely ACS vs PE vs AAA given no risk factors for these. VS are WNL. Will obtain labs, imaging, provide med and IVF and reassess.

## 2020-10-02 NOTE — ED ADULT NURSE NOTE - OBJECTIVE STATEMENT
Patient is a 39y female, A&Ox4, ambulatory, steady gait observed, history of thyroidectomy, complaining of waking up in the middle of the night with chest/epigastric pain, associated with a couple episodes of vomiting. At this time patient is nauseas, not actively vomiting. also reports bilateral hand paresthesia. Placed on cardiac monitor, sinus rhythm, IV initiated 20 gauge right antecubital. labs drawn and sent. Stretcher in lowest position, wheels locked, side rails up as per protocol. Call bell in reach. Will continue to monitor. Awaiting radiology results. Patient is a 39y female, A&Ox4, ambulatory, steady gait observed, history of thyroidectomy, complaining of waking up in the middle of the night with chest/epigastric pain, associated with a couple episodes of vomiting. At this time patient is nauseas, not actively vomiting. also reports bilateral hand paresthesia. Placed on cardiac monitor, sinus rhythm, IV initiated 20 gauge right antecubital. labs drawn and sent. Darkened lights and warm blanket provided for comfort measures. Stretcher in lowest position, wheels locked, side rails up as per protocol. Call bell in reach. Will continue to monitor. Awaiting radiology results.

## 2020-10-02 NOTE — ED PROVIDER NOTE - OBJECTIVE STATEMENT
40 yo f pmh thyroid CA s/p thyroidectomy, s/p cholecystectomy, pw cp. pt reports was in usual state of health last night. woke up in middle of the night with chest/epigastric pain radiating to sides and to throat, intermittent, 10/10, no known exacerbating/remitting sx,  no prior hx of similar sx. reports nausea and b/l hand paresthesias w/ sx. denies sob, cough, congestion, ha, f/c, gu sx.

## 2020-10-02 NOTE — ED CDU PROVIDER INITIAL DAY NOTE - OBJECTIVE STATEMENT
40 yo f pmh thyroid CA s/p thyroidectomy, s/p cholecystectomy, pw cp. pt reports was in usual state of health last night. woke up in middle of the night with chest/epigastric pain radiating to sides and to throat, intermittent, 10/10, no known exacerbating/remitting sx,  no prior hx of similar sx. reports nausea and b/l hand paresthesias w/ sx. denies sob, cough, congestion, ha, f/c, gu sx.    CDU VLADIMIR Kohler: Agree with above. Pt pain now better controlled. US and rpt labs pending.

## 2020-10-02 NOTE — ED CDU PROVIDER INITIAL DAY NOTE - ATTENDING CONTRIBUTION TO CARE
I performed a face-to-face evaluation of the patient and performed a history and physical examination. I agree with the history and physical examination.    Curtis: H/o lap cholecystectomy 5-6 yrs ago. Awoke w/ severe epig. pain and nausea. Now resolved. Takes Motrin frequently. Suspect she passed a retained gallstone (or had Sphincter of Oddi dysfunction) or has early PUD 2/2 NSAIDs. Get RUQ US. Repeat LFTs.

## 2020-10-02 NOTE — ED CDU PROVIDER DISPOSITION NOTE - NS_EDPROVIDERDISPOUSERTYPE_ED_A_ED
Addended by: Roya Jsoe on: 10/25/2018 06:23 PM     Modules accepted: Orders
Attending Attestation (For Attendings USE Only)...

## 2020-10-02 NOTE — ED PROVIDER NOTE - CLINICAL SUMMARY MEDICAL DECISION MAKING FREE TEXT BOX
ciara pgy3: 40 yo f pw chest/epigastric pain, acute, radiating to sides. pt arrives uncomfortable appearing, hds, vss. perc negative. no hx cad in self/family. recent physical and pcp check up reported normal. low suspicion acs, aortic catastrophe, retained gallstone. possible dyspepsia vs pancreatitis. labs, imaging, sx control, reassess.

## 2020-10-02 NOTE — ED CDU PROVIDER DISPOSITION NOTE - ATTENDING CONTRIBUTION TO CARE
I performed a face-to-face evaluation of the patient and performed a history and physical examination. I agree with the history and physical examination.    Awoke w/ severe epig. pain and nausea. Now resolved. LFTs increased from initial to second set, and now decreasing slightly. Liver synthetic fxn intact. Suspect she passed a retained gallstone (had lap cholecystectomy 5-6 yrs ago) or had Sphincter of Oddi dysfunction. F/u GI.

## 2020-10-02 NOTE — ED PROVIDER NOTE - NS ED ROS FT
GENERAL: No fever or chills, //             EYES: no change in vision, //             HEENT: no trouble swallowing or speaking, //             CARDIAC: no chest pain, //              PULMONARY: cp, //             GI: nausea //             : No changes in urination,  //            SKIN: no rashes,  //            NEURO: no headache,  //             MSK: No joint pain otherwise as HPI or negative. ~Adrian Dye DO PGY3

## 2020-10-02 NOTE — ED CDU PROVIDER INITIAL DAY NOTE - MEDICAL DECISION MAKING DETAILS
38 yo f pmh thyroid CA s/p thyroidectomy, s/p cholecystectomy, pw cp/ abdominal pain  trops neg. ekg nsr. ct with no acute abnormalities. US and rpt labs pending. pain controlled.

## 2020-10-02 NOTE — ED CDU PROVIDER INITIAL DAY NOTE - PROGRESS NOTE DETAILS
Pt in no acute pain, dull discomfort, but tolerable with toleration of food. Pt feels much improved from original presentation. Pt had elevation in LFTs, rpt with comparable values. Will have patient follow with GI.

## 2020-10-02 NOTE — ED ADULT NURSE NOTE - NSIMPLEMENTINTERV_GEN_ALL_ED
Implemented All Universal Safety Interventions:  [place] to call system. Call bell, personal items and telephone within reach. Instruct patient to call for assistance. Room bathroom lighting operational. Non-slip footwear when patient is off stretcher. Physically safe environment: no spills, clutter or unnecessary equipment. Stretcher in lowest position, wheels locked, appropriate side rails in place.

## 2020-11-09 ENCOUNTER — RESULT REVIEW (OUTPATIENT)
Age: 40
End: 2020-11-09

## 2020-12-16 ENCOUNTER — TRANSCRIPTION ENCOUNTER (OUTPATIENT)
Age: 40
End: 2020-12-16

## 2022-01-13 ENCOUNTER — APPOINTMENT (OUTPATIENT)
Dept: ULTRASOUND IMAGING | Facility: IMAGING CENTER | Age: 42
End: 2022-01-13

## 2022-01-13 ENCOUNTER — APPOINTMENT (OUTPATIENT)
Dept: MAMMOGRAPHY | Facility: IMAGING CENTER | Age: 42
End: 2022-01-13

## 2022-05-03 NOTE — ED CDU PROVIDER DISPOSITION NOTE - NSFOLLOWUPINSTRUCTIONS_ED_ALL_ED_FT
Ante Partum High Risk Pregnancy Note    PATIENT: Leticia Bui  MRN: [id number]      Patient admitted for aspiration pneumonia, SOB CP and PTL states she does have chest pain, denies SOB, denies contractions. Overall states feeling much better, states minimal back pain  Sitting up in chair eating  LOS:    Vitals: Temp (24hrs), Av.9 °F (37.2 °C), Min:97.8 °F (36.6 °C), Max:99.9 °F (37.7 °C)   Patient Vitals for the past 24 hrs:   BP   22 0836 116/77   22 0732 127/80   22 0610 117/62   22 0301 129/69   22 0201 130/72   22 0101 125/67   22 0001 121/66   22 2301 126/70   22 2201 (!) 113/59   22 2101 125/61   22 1801 (!) 145/71   22 1704 133/68   22 1601 138/72   22 1529 133/66   22 1417 139/78   22 1402 127/75   22 1332 132/69   22 1317 129/65   22 1302 132/62   22 1233 136/71   22 1217 (!) 147/76   22 1204 (!) 167/78   22 1148 134/77   22 1132 (!) 141/75   22 1119 (!) 150/78   22 1031 (!) 143/74   22 1017 (!) 179/98   22 1000 (!) 147/74   22 0945 (!) 162/85   22 0931 131/73   22 0915 (!) 160/88       I&O:   No intake/output data recorded.  1901 -  0700  In: 2100 [P.O.:1800; I.V.:300]  Out: 4250 [Urine:4250]  Pulse oximetry assessment   96% at rest on room air (if 88% or less, skip next steps)         Exam:  Patient without distress. Abdomen: soft, non-tender               Fundus: soft and non tender               Fundal Height: 33 cm               Right Upper Quadrant: non-tender               Perineum: No sign of blood or amniotic fluid               Lower Extremities: 2+ pitting edema                        Clonus: absent               NST Procedure Note    Patient: Leticia Bui               Sex: female          DOA: 2022       YOB: 1999      Age:  24 y. o. LOS:  LOS: 1 day     MRN: [id number]                    CSN: [id number]      Estimated Gestational Age:33w1d     Indication for NST: Pre-Term labor evaluation    NST: 10x10    Fetal Vital Signs:  Mode: External  Fetal Heart Rate:160  Fetal Activity: Present  Variability: Moderate 6-25 bpm  Decelerations:No  Accelerations:Yes  FHR Interpretations:Category II    Non-Stress Test: obgyn fetal nst findings: Cat 1  15 x 15 not reactive but overall reassuring  Plan scan today with MFM can add BPP     Uterine Activity:  Mode: External  Frequency (min): none       Signed by:Letha Marquez DO  5/3/2022 9:14 AM               Results for orders placed or performed during the hospital encounter of 05/01/22   1.  CULTURE, URINE    Specimen: Clean catch; Urine    URINE   Result Value Ref Range    Special Requests: NO SPECIAL REQUESTS      Culture result:        10,000 to 50,000 COLONIES/mL NORMAL SKIN SAVANNAH ISOLATED   2. COVID-19 RAPID TEST   Result Value Ref Range    Specimen source NASAL      COVID-19 rapid test Not detected NOTD     3. RESPIRATORY VIRUS PANEL W/COVID-19, PCR    Specimen: Nasopharyngeal   Result Value Ref Range    Adenovirus NOT DETECTED NOTDET      Coronavirus 229E NOT DETECTED NOTDET      Coronavirus HKU1 NOT DETECTED NOTDET      Coronavirus CVNL63 NOT DETECTED NOTDET      Coronavirus OC43 NOT DETECTED NOTDET      SARS-CoV-2, PCR NOT DETECTED NOTDET      Metapneumovirus NOT DETECTED NOTDET      Rhinovirus and Enterovirus NOT DETECTED NOTDET      Influenza A NOT DETECTED NOTDET      Influenza B NOT DETECTED NOTDET      Parainfluenza 1 NOT DETECTED NOTDET      Parainfluenza 2 NOT DETECTED NOTDET      Parainfluenza 3 NOT DETECTED NOTDET      Parainfluenza virus 4 NOT DETECTED NOTDET      RSV by PCR NOT DETECTED NOTDET      B. parapertussis, PCR NOT DETECTED NOTDET      Bordetella pertussis - PCR NOT DETECTED NOTDET      Chlamydophila pneumoniae DNA, QL, PCR NOT DETECTED NOTDET      Mycoplasma pneumoniae DNA, QL, PCR NOT DETECTED NOTDET     4. CBC W/O DIFF   Result Value Ref Range    WBC 16.2 (H) 4.3 - 11.1 K/uL    RBC 3.51 (L) 4.05 - 5.2 M/uL    HGB 9.4 (L) 11.7 - 15.4 g/dL    HCT 29.5 (L) 35.8 - 46.3 %    MCV 84.0 79.6 - 97.8 FL    MCH 26.8 26.1 - 32.9 PG    MCHC 31.9 31.4 - 35.0 g/dL    RDW 13.1 11.9 - 14.6 %    PLATELET 611 754 - 463 K/uL    MPV 11.5 9.4 - 12.3 FL    ABSOLUTE NRBC 0.04 0.0 - 0.2 K/uL   5. BUN   Result Value Ref Range    BUN 8 6 - 23 MG/DL   6. CREATININE   Result Value Ref Range    Creatinine 0.69 0.6 - 1.0 MG/DL   7. HEPATIC FUNCTION PANEL   Result Value Ref Range    Protein, total 6.8 6.3 - 8.2 g/dL    Albumin 2.6 (L) 3.5 - 5.0 g/dL    Globulin 4.2 (H) 2.3 - 3.5 g/dL    A-G Ratio 0.6 (L) 1.2 - 3.5      Bilirubin, total 0.2 0.2 - 1.1 MG/DL    Bilirubin, direct 0.1 <0.4 MG/DL    Alk. phosphatase 81 50 - 130 U/L    AST (SGOT) 33 15 - 37 U/L    ALT (SGPT) 15 12 - 65 U/L   8. PROTEIN/CREATININE RATIO, URINE   Result Value Ref Range    Protein, urine random 12 mg/dL    Creatinine, urine 55.00 mg/dL    Protein/Creat. urine Ratio 0.2     9. TSH 3RD GENERATION   Result Value Ref Range    TSH 3.710 uIU/mL   10. T4, FREE   Result Value Ref Range    T4, Free 0.8 0.78 - 1.46 NG/DL   11. PROTEIN, URINE, 24 HR   Result Value Ref Range    Period of collection 24 hr    Volume 2,840 mL    Protein, urine random 11 mg/dL    Protein,urine 24 hr 312 mg/24hr   12. NT-PRO BNP   Result Value Ref Range    NT pro-BNP 1,288 (H) 5 - 125 PG/ML   13.  CBC WITH AUTOMATED DIFF   Result Value Ref Range    WBC 21.4 (H) 4.3 - 11.1 K/uL    RBC 3.64 (L) 4.05 - 5.2 M/uL    HGB 9.7 (L) 11.7 - 15.4 g/dL    HCT 30.4 (L) 35.8 - 46.3 %    MCV 83.5 79.6 - 97.8 FL    MCH 26.6 26.1 - 32.9 PG    MCHC 31.9 31.4 - 35.0 g/dL    RDW 13.2 11.9 - 14.6 %    PLATELET 750 266 - 410 K/uL    MPV 11.2 9.4 - 12.3 FL    ABSOLUTE NRBC 0.03 0.0 - 0.2 K/uL    DF AUTOMATED      NEUTROPHILS 81 (H) 43 - 78 %    LYMPHOCYTES 7 (L) 13 - 44 %    MONOCYTES 6 4.0 - 12.0 %    EOSINOPHILS 0 (L) 0.5 - 7.8 %    BASOPHILS 0 0.0 - 2.0 %    IMMATURE GRANULOCYTES 5 0.0 - 5.0 %    ABS. NEUTROPHILS 17.4 (H) 1.7 - 8.2 K/UL    ABS. LYMPHOCYTES 1.6 0.5 - 4.6 K/UL    ABS. MONOCYTES 1.3 0.1 - 1.3 K/UL    ABS. EOSINOPHILS 0.1 0.0 - 0.8 K/UL    ABS. BASOPHILS 0.1 0.0 - 0.2 K/UL    ABS. IMM. GRANS. 1.0 (H) 0.0 - 0.5 K/UL   14. METABOLIC PANEL, COMPREHENSIVE   Result Value Ref Range    Sodium 142 136 - 145 mmol/L    Potassium 3.3 (L) 3.5 - 5.1 mmol/L    Chloride 110 (H) 98 - 107 mmol/L    CO2 23 21 - 32 mmol/L    Anion gap 9 7 - 16 mmol/L    Glucose 90 65 - 100 mg/dL    BUN 10 6 - 23 MG/DL    Creatinine 0.69 0.6 - 1.0 MG/DL    GFR est AA >60 >60 ml/min/1.73m2    GFR est non-AA >60 >60 ml/min/1.73m2    Calcium 8.5 8.3 - 10.4 MG/DL    Bilirubin, total 0.3 0.2 - 1.1 MG/DL    ALT (SGPT) 16 12 - 65 U/L    AST (SGOT) 25 15 - 37 U/L    Alk. phosphatase 88 50 - 130 U/L    Protein, total 6.6 6.3 - 8.2 g/dL    Albumin 2.4 (L) 3.5 - 5.0 g/dL    Globulin 4.2 (H) 2.3 - 3.5 g/dL    A-G Ratio 0.6 (L) 1.2 - 3.5     15. URINALYSIS W/ RFLX MICROSCOPIC   Result Value Ref Range    Color YELLOW      Appearance CLEAR      Specific gravity 1.042 (H) 1.001 - 1.023      pH (UA) 6.5 5.0 - 9.0      Protein Negative NEG mg/dL    Glucose Negative mg/dL    Ketone Negative NEG mg/dL    Bilirubin Negative NEG      Blood TRACE (A) NEG      Urobilinogen 0.2 0.2 - 1.0 EU/dL    Nitrites Negative NEG      Leukocyte Esterase TRACE (A) NEG      WBC 5-10 0 /hpf    RBC 0-3 0 /hpf    Epithelial cells 3-5 0 /hpf    Bacteria 0 0 /hpf    Casts 0-3 0 /lpf   16. LACTIC ACID   Result Value Ref Range    Lactic acid 2.3 (H) 0.4 - 2.0 MMOL/L   17. PROCALCITONIN   Result Value Ref Range    Procalcitonin 0.11 0.00 - 0.49 ng/mL   18. TROPONIN-HIGH SENSITIVITY   Result Value Ref Range    Troponin-High Sensitivity 12.1 0 - 14 pg/mL   19.  CBC WITH AUTOMATED DIFF   Result Value Ref Range    WBC 17.9 (H) 4.3 - 11.1 K/uL    RBC 3.26 (L) 4.05 - 5.2 M/uL HGB 8.8 (L) 11.7 - 15.4 g/dL    HCT 26.6 (L) 35.8 - 46.3 %    MCV 81.6 79.6 - 97.8 FL    MCH 27.0 26.1 - 32.9 PG    MCHC 33.1 31.4 - 35.0 g/dL    RDW 13.2 11.9 - 14.6 %    PLATELET 304 825 - 050 K/uL    MPV 10.8 9.4 - 12.3 FL    ABSOLUTE NRBC 0.02 0.0 - 0.2 K/uL    DF AUTOMATED      NEUTROPHILS 79 (H) 43 - 78 %    LYMPHOCYTES 8 (L) 13 - 44 %    MONOCYTES 7 4.0 - 12.0 %    EOSINOPHILS 1 0.5 - 7.8 %    BASOPHILS 1 0.0 - 2.0 %    IMMATURE GRANULOCYTES 5 0.0 - 5.0 %    ABS. NEUTROPHILS 14.2 (H) 1.7 - 8.2 K/UL    ABS. LYMPHOCYTES 1.4 0.5 - 4.6 K/UL    ABS. MONOCYTES 1.2 0.1 - 1.3 K/UL    ABS. EOSINOPHILS 0.2 0.0 - 0.8 K/UL    ABS. BASOPHILS 0.1 0.0 - 0.2 K/UL    ABS. IMM. GRANS. 0.9 (H) 0.0 - 0.5 K/UL   20. METABOLIC PANEL, COMPREHENSIVE   Result Value Ref Range    Sodium 141 136 - 145 mmol/L    Potassium 3.7 3.5 - 5.1 mmol/L    Chloride 110 (H) 98 - 107 mmol/L    CO2 26 21 - 32 mmol/L    Anion gap 5 (L) 7 - 16 mmol/L    Glucose 99 65 - 100 mg/dL    BUN 6 6 - 23 MG/DL    Creatinine 0.52 (L) 0.6 - 1.0 MG/DL    GFR est AA >60 >60 ml/min/1.73m2    GFR est non-AA >60 >60 ml/min/1.73m2    Calcium 7.9 (L) 8.3 - 10.4 MG/DL    Bilirubin, total 0.3 0.2 - 1.1 MG/DL    ALT (SGPT) 17 12 - 65 U/L    AST (SGOT) 22 15 - 37 U/L    Alk. phosphatase 79 50 - 130 U/L    Protein, total 5.8 (L) 6.3 - 8.2 g/dL    Albumin 2.1 (L) 3.5 - 5.0 g/dL    Globulin 3.7 (H) 2.3 - 3.5 g/dL    A-G Ratio 0.6 (L) 1.2 - 3.5     21. PROCALCITONIN   Result Value Ref Range    Procalcitonin 19.32 (H) 0.00 - 0.49 ng/mL   22. POC URINE DIPSTICK MANUAL   Result Value Ref Range    Protein (POC) Negative Negative    Glucose, urine (POC) Negative Negative    Ketones (POC) Negative Negative   23.  EKG, 12 LEAD, INITIAL   Result Value Ref Range    Ventricular Rate 77 BPM    Atrial Rate 77 BPM    P-R Interval 160 ms    QRS Duration 80 ms    Q-T Interval 362 ms    QTC Calculation (Bezet) 409 ms    Calculated P Axis 73 degrees    Calculated R Axis 60 degrees    Calculated T Axis 33 degrees    Diagnosis       Normal sinus rhythm with sinus arrhythmia  Normal ECG    Confirmed by ST MEMO PHILLIPS MD ()CARLA ([id number]) on 5/2/2022 4:51:44 PM     24. EKG, 12 LEAD, INITIAL   Result Value Ref Range    Ventricular Rate 88 BPM    Atrial Rate 88 BPM    P-R Interval 160 ms    QRS Duration 78 ms    Q-T Interval 344 ms    QTC Calculation (Bezet) 416 ms    Calculated P Axis 73 degrees    Calculated R Axis 52 degrees    Calculated T Axis 31 degrees    Diagnosis       Normal sinus rhythm  Normal ECG  When compared with ECG of 01-MAY-2022 18:44,  No significant change was found  Confirmed by ST MEMO PHILLIPS MD (), CARLA PERALTA ([id number]) on 5/2/2022 5:26:22 PM     25. TYPE & SCREEN   Result Value Ref Range    Crossmatch Expiration 05/05/2022,2359     ABO/Rh(D) A POSITIVE     Antibody screen NEG    26.  ECHO ADULT COMPLETE   Result Value Ref Range    LV EDV A2C 111 mL    LV EDV A4C 109 mL    LV ESV A2C 50 mL    LV ESV A4C 49 mL    IVSd 0.9 0.6 - 0.9 cm    LVIDd 4.8 3.9 - 5.3 cm    LVIDs 3.5 cm    LVOT Diameter 1.8 cm    LVOT Mean Gradient 4 mmHg    LVOT VTI 26.6 cm    LVOT Peak Velocity 1.2 m/s    LVOT Peak Gradient 6 mmHg    LVPWd 0.9 0.6 - 0.9 cm    LV E' Lateral Velocity 15 cm/s    LV E' Septal Velocity 12 cm/s    LV Ejection Fraction A2C 55 %    LV Ejection Fraction A4C 55 %    EF BP 56 55 - 100 %    LVOT Area 2.5 cm2    LVOT SV 67.7 ml    LA Minor Axis 5.2 cm    LA Major [place] 5.3 cm    LA Area 2C 14.6 cm2    LA Area 4C 18.8 cm2    LA Volume BP 43 22 - 52 mL    LA Diameter 3.4 cm    AV Mean Gradient 7 mmHg    AV VTI 40.4 cm    AV Mean Velocity 1.3 m/s    AV Peak Velocity 1.8 m/s    AV Peak Gradient 13 mmHg    AV Area by VTI 1.7 cm2    AV Area by Peak Velocity 1.8 cm2    Aortic Root 2.4 cm    Ascending Aorta 2.6 cm    IVC Expiration 1.5 cm    MV Nyquist Velocity 23 cm/s    MR Radius PISA 0.50 cm    MR .0 cm    MR Peak Velocity 5.4 m/s    MR Peak Gradient 117 mmHg    MV E Wave Deceleration Time 225.0 ms MV A Velocity 0.99 m/s    MV E Velocity 1.54 m/s    MV EROA PISA 6.7 cm2    WA Max Velocity 1.6 m/s    Pulmonary Artery EDP 10 mmHg    PV .0 ms    PV Max Velocity 1.2 m/s    PV Peak Gradient 6 mmHg    RVIDd 2.6 cm    RV Basal Dimension 3.7 cm    TAPSE 1.9 1.7 cm    TR Max Velocity 2.71 m/s    TR Peak Gradient 29 mmHg    Fractional Shortening 2D 27 28 - 44 %    LV RWT Ratio 0.38     LV Mass 2D 147.8 67 - 162 g    MV E/A 1.56     E/E' Ratio (Averaged) 11.55     E/E' Lateral 10.27     E/E' Septal 12.83     LA/AO Root Ratio 1.42     AV Velocity Ratio 0.67     LVOT:AV VTI Index 0.66     MR Regurg Volume PISA 1,290.60 mL    Est. RA Pressure 3 mmHg    RVSP 32 mmHg              Labs:   Recent Results (from the past 24 hour(s))   NT-PRO BNP    Collection Time: 05/02/22 10:26 AM   Result Value Ref Range    NT pro-BNP 1,288 (H) 5 - 125 PG/ML   CBC WITH AUTOMATED DIFF    Collection Time: 05/02/22 10:26 AM   Result Value Ref Range    WBC 21.4 (H) 4.3 - 11.1 K/uL    RBC 3.64 (L) 4.05 - 5.2 M/uL    HGB 9.7 (L) 11.7 - 15.4 g/dL    HCT 30.4 (L) 35.8 - 46.3 %    MCV 83.5 79.6 - 97.8 FL    MCH 26.6 26.1 - 32.9 PG    MCHC 31.9 31.4 - 35.0 g/dL    RDW 13.2 11.9 - 14.6 %    PLATELET 709 528 - 050 K/uL    MPV 11.2 9.4 - 12.3 FL    ABSOLUTE NRBC 0.03 0.0 - 0.2 K/uL    DF AUTOMATED      NEUTROPHILS 81 (H) 43 - 78 %    LYMPHOCYTES 7 (L) 13 - 44 %    MONOCYTES 6 4.0 - 12.0 %    EOSINOPHILS 0 (L) 0.5 - 7.8 %    BASOPHILS 0 0.0 - 2.0 %    IMMATURE GRANULOCYTES 5 0.0 - 5.0 %    ABS. NEUTROPHILS 17.4 (H) 1.7 - 8.2 K/UL    ABS. LYMPHOCYTES 1.6 0.5 - 4.6 K/UL    ABS. MONOCYTES 1.3 0.1 - 1.3 K/UL    ABS. EOSINOPHILS 0.1 0.0 - 0.8 K/UL    ABS. BASOPHILS 0.1 0.0 - 0.2 K/UL    ABS. IMM. GRANS.  1.0 (H) 0.0 - 0.5 K/UL   METABOLIC PANEL, COMPREHENSIVE    Collection Time: 05/02/22 10:26 AM   Result Value Ref Range    Sodium 142 136 - 145 mmol/L    Potassium 3.3 (L) 3.5 - 5.1 mmol/L    Chloride 110 (H) 98 - 107 mmol/L    CO2 23 21 - 32 mmol/L Anion gap 9 7 - 16 mmol/L    Glucose 90 65 - 100 mg/dL    BUN 10 6 - 23 MG/DL    Creatinine 0.69 0.6 - 1.0 MG/DL    GFR est AA >60 >60 ml/min/1.73m2    GFR est non-AA >60 >60 ml/min/1.73m2    Calcium 8.5 8.3 - 10.4 MG/DL    Bilirubin, total 0.3 0.2 - 1.1 MG/DL    ALT (SGPT) 16 12 - 65 U/L    AST (SGOT) 25 15 - 37 U/L    Alk.  phosphatase 88 50 - 130 U/L    Protein, total 6.6 6.3 - 8.2 g/dL    Albumin 2.4 (L) 3.5 - 5.0 g/dL    Globulin 4.2 (H) 2.3 - 3.5 g/dL    A-G Ratio 0.6 (L) 1.2 - 3.5     TYPE & SCREEN    Collection Time: 05/02/22 10:26 AM   Result Value Ref Range    Crossmatch Expiration 05/05/2022,2359     ABO/Rh(D) A POSITIVE     Antibody screen NEG    PROCALCITONIN    Collection Time: 05/02/22 10:26 AM   Result Value Ref Range    Procalcitonin 0.11 0.00 - 0.49 ng/mL   TROPONIN-HIGH SENSITIVITY    Collection Time: 05/02/22 10:26 AM   Result Value Ref Range    Troponin-High Sensitivity 12.1 0 - 14 pg/mL   CULTURE, BLOOD    Collection Time: 05/02/22 12:06 PM    Specimen: Blood   Result Value Ref Range    Special Requests: RIGHT  HAND        Culture result: NO GROWTH AFTER 13 HOURS     LACTIC ACID    Collection Time: 05/02/22 12:06 PM   Result Value Ref Range    Lactic acid 2.3 (H) 0.4 - 2.0 MMOL/L   CULTURE, BLOOD    Collection Time: 05/02/22 12:19 PM    Specimen: Blood   Result Value Ref Range    Special Requests: RIGHT  HAND        Culture result: NO GROWTH AFTER 13 HOURS     URINALYSIS W/ RFLX MICROSCOPIC    Collection Time: 05/02/22  1:46 PM   Result Value Ref Range    Color YELLOW      Appearance CLEAR      Specific gravity 1.042 (H) 1.001 - 1.023      pH (UA) 6.5 5.0 - 9.0      Protein Negative NEG mg/dL    Glucose Negative mg/dL    Ketone Negative NEG mg/dL    Bilirubin Negative NEG      Blood TRACE (A) NEG      Urobilinogen 0.2 0.2 - 1.0 EU/dL    Nitrites Negative NEG      Leukocyte Esterase TRACE (A) NEG      WBC 5-10 0 /hpf    RBC 0-3 0 /hpf    Epithelial cells 3-5 0 /hpf    Bacteria 0 0 /hpf    Casts 0-3 0 /lpf   CULTURE, URINE    Collection Time: 05/02/22  1:47 PM    Specimen: Clean catch; Urine    URINE   Result Value Ref Range    Special Requests: NO SPECIAL REQUESTS      Culture result: NO GROWTH 1 DAY     COVID-19 RAPID TEST    Collection Time: 05/02/22  1:54 PM   Result Value Ref Range    Specimen source NASAL      COVID-19 rapid test Not detected NOTD     EKG, 12 LEAD, INITIAL    Collection Time: 05/02/22  2:41 PM   Result Value Ref Range    Ventricular Rate 88 BPM    Atrial Rate 88 BPM    P-R Interval 160 ms    QRS Duration 78 ms    Q-T Interval 344 ms    QTC Calculation (Bezet) 416 ms    Calculated P Axis 73 degrees    Calculated R Axis 52 degrees    Calculated T Axis 31 degrees    Diagnosis       Normal sinus rhythm  Normal ECG  When compared with ECG of 01-MAY-2022 18:44,  No significant change was found  Confirmed by ST MEMO PHILLIPS MD (), CARLA PERALTA ([id number]) on 5/2/2022 5:26:22 PM     CULTURE, RESPIRATORY/SPUTUM/BRONCH W GRAM STAIN    Collection Time: 05/02/22  5:01 PM    Specimen: Sputum   Result Value Ref Range    Special Requests: NO SPECIAL REQUESTS      GRAM STAIN PENDING     Culture result:        NO GROWTH AFTER SHORT PERIOD OF INCUBATION. FURTHER RESULTS TO FOLLOW AFTER OVERNIGHT INCUBATION. CBC WITH AUTOMATED DIFF    Collection Time: 05/03/22  6:23 AM   Result Value Ref Range    WBC 17.9 (H) 4.3 - 11.1 K/uL    RBC 3.26 (L) 4.05 - 5.2 M/uL    HGB 8.8 (L) 11.7 - 15.4 g/dL    HCT 26.6 (L) 35.8 - 46.3 %    MCV 81.6 79.6 - 97.8 FL    MCH 27.0 26.1 - 32.9 PG    MCHC 33.1 31.4 - 35.0 g/dL    RDW 13.2 11.9 - 14.6 %    PLATELET 934 834 - 520 K/uL    MPV 10.8 9.4 - 12.3 FL    ABSOLUTE NRBC 0.02 0.0 - 0.2 K/uL    DF AUTOMATED      NEUTROPHILS 79 (H) 43 - 78 %    LYMPHOCYTES 8 (L) 13 - 44 %    MONOCYTES 7 4.0 - 12.0 %    EOSINOPHILS 1 0.5 - 7.8 %    BASOPHILS 1 0.0 - 2.0 %    IMMATURE GRANULOCYTES 5 0.0 - 5.0 %    ABS. NEUTROPHILS 14.2 (H) 1.7 - 8.2 K/UL    ABS. LYMPHOCYTES 1.4 0.5 - 4.6 K/UL    ABS. MONOCYTES 1.2 0.1 - 1.3 K/UL    ABS. EOSINOPHILS 0.2 0.0 - 0.8 K/UL    ABS. BASOPHILS 0.1 0.0 - 0.2 K/UL    ABS. IMM. GRANS. 0.9 (H) 0.0 - 0.5 K/UL   METABOLIC PANEL, COMPREHENSIVE    Collection Time: 22  6:23 AM   Result Value Ref Range    Sodium 141 136 - 145 mmol/L    Potassium 3.7 3.5 - 5.1 mmol/L    Chloride 110 (H) 98 - 107 mmol/L    CO2 26 21 - 32 mmol/L    Anion gap 5 (L) 7 - 16 mmol/L    Glucose 99 65 - 100 mg/dL    BUN 6 6 - 23 MG/DL    Creatinine 0.52 (L) 0.6 - 1.0 MG/DL    GFR est AA >60 >60 ml/min/1.73m2    GFR est non-AA >60 >60 ml/min/1.73m2    Calcium 7.9 (L) 8.3 - 10.4 MG/DL    Bilirubin, total 0.3 0.2 - 1.1 MG/DL    ALT (SGPT) 17 12 - 65 U/L    AST (SGOT) 22 15 - 37 U/L    Alk.  phosphatase 79 50 - 130 U/L    Protein, total 5.8 (L) 6.3 - 8.2 g/dL    Albumin 2.1 (L) 3.5 - 5.0 g/dL    Globulin 3.7 (H) 2.3 - 3.5 g/dL    A-G Ratio 0.6 (L) 1.2 - 3.5     PROCALCITONIN    Collection Time: 22  6:23 AM   Result Value Ref Range    Procalcitonin 19.32 (H) 0.00 - 0.49 ng/mL       Assessment and Plan:      Principal Problem:    Aspiration pneumonia due to food (regurgitated) ([address] (2022)    Active Problems:    Aspiration pneumonia ([address] (2022)      Threatened  labor, third trimester (2022)      Abdominal pain during pregnancy in third trimester (2022)      Shortness of breath during pregnancy (2022)      Sepsis due to anaerobic streptococci ([address] (2022)      Acute respiratory failure with hypoxia ([address] (2022)      Hypoxia (2022)          PTL  Aspiration pneumonia- continue abx hypoxia resolved  Awaiting echo results- if normal discharge  24 hour urine 312 mg will start 2x weekly testing if is discharged- I any more severe pressures would call preeclampsia and deliver at 37 weeks Follow up with Gastroenterology regarding the possibility you passed a retained gallstone (had lap cholecystectomy 5-6 yrs ago) or had Sphincter of Oddi dysfunction.     Tylenol 500 mg (1 or 2 every 6 hours) and/or naproxen 500 mg (1 every 12 hours) for pain control.    Return if pain not controlled with oral medications.

## 2022-05-05 NOTE — ED CDU PROVIDER DISPOSITION NOTE - PLAN OF CARE
[FreeTextEntry1] : spots on the face [de-identified] : 74M with no personal hx of skin cancer here for spots on the face x months.  Advance activity as tolerated.  Continue all previously prescribed medications as directed unless otherwise instructed.  Continue taking supplemental calcium for a total of 1000 to 2000 elemental mg in four divided doses.  Take Calcitriol 0.25 mcg once daily.  Follow up with your primary care physician or Dr. Hogan in 48-72 hours and gastroenterology in 1 week to have repeat blood work to check your Calcium level and liver function tests- bring copies of your results.  Return to the ER for worsening or persistent symptoms, including but not limited to cramps, numbness, lightheadedness, chest pain, palpitations, and/or ANY NEW OR CONCERNING SYMPTOMS. If you have issues obtaining follow up, please call: [phone] to obtain a doctor or specialist who takes your insurance in your area.  You may call [phone] to make an appointment with the internal medicine clinic.

## 2022-06-09 ENCOUNTER — EMERGENCY (EMERGENCY)
Facility: HOSPITAL | Age: 42
LOS: 1 days | Discharge: HOME | End: 2022-06-09
Attending: EMERGENCY MEDICINE | Admitting: EMERGENCY MEDICINE
Payer: COMMERCIAL

## 2022-06-09 VITALS
SYSTOLIC BLOOD PRESSURE: 111 MMHG | TEMPERATURE: 98 F | HEART RATE: 71 BPM | RESPIRATION RATE: 17 BRPM | OXYGEN SATURATION: 100 % | DIASTOLIC BLOOD PRESSURE: 58 MMHG

## 2022-06-09 VITALS
TEMPERATURE: 98 F | HEART RATE: 74 BPM | OXYGEN SATURATION: 100 % | RESPIRATION RATE: 16 BRPM | DIASTOLIC BLOOD PRESSURE: 82 MMHG | HEIGHT: 66 IN | SYSTOLIC BLOOD PRESSURE: 145 MMHG

## 2022-06-09 DIAGNOSIS — O02.1 MISSED ABORTION: Chronic | ICD-10-CM

## 2022-06-09 DIAGNOSIS — Z98.89 OTHER SPECIFIED POSTPROCEDURAL STATES: Chronic | ICD-10-CM

## 2022-06-09 LAB
ALBUMIN SERPL ELPH-MCNC: 4.4 G/DL — SIGNIFICANT CHANGE UP (ref 3.3–5)
ALP SERPL-CCNC: 65 U/L — SIGNIFICANT CHANGE UP (ref 40–120)
ALT FLD-CCNC: 13 U/L — SIGNIFICANT CHANGE UP (ref 4–33)
ANION GAP SERPL CALC-SCNC: 11 MMOL/L — SIGNIFICANT CHANGE UP (ref 7–14)
AST SERPL-CCNC: 16 U/L — SIGNIFICANT CHANGE UP (ref 4–32)
BASOPHILS # BLD AUTO: 0.06 K/UL — SIGNIFICANT CHANGE UP (ref 0–0.2)
BASOPHILS NFR BLD AUTO: 0.6 % — SIGNIFICANT CHANGE UP (ref 0–2)
BILIRUB SERPL-MCNC: <0.2 MG/DL — SIGNIFICANT CHANGE UP (ref 0.2–1.2)
BUN SERPL-MCNC: 13 MG/DL — SIGNIFICANT CHANGE UP (ref 7–23)
CALCIUM SERPL-MCNC: 9.6 MG/DL — SIGNIFICANT CHANGE UP (ref 8.4–10.5)
CHLORIDE SERPL-SCNC: 106 MMOL/L — SIGNIFICANT CHANGE UP (ref 98–107)
CO2 SERPL-SCNC: 23 MMOL/L — SIGNIFICANT CHANGE UP (ref 22–31)
CREAT SERPL-MCNC: 0.77 MG/DL — SIGNIFICANT CHANGE UP (ref 0.5–1.3)
EGFR: 99 ML/MIN/1.73M2 — SIGNIFICANT CHANGE UP
EOSINOPHIL # BLD AUTO: 0.24 K/UL — SIGNIFICANT CHANGE UP (ref 0–0.5)
EOSINOPHIL NFR BLD AUTO: 2.4 % — SIGNIFICANT CHANGE UP (ref 0–6)
GLUCOSE SERPL-MCNC: 94 MG/DL — SIGNIFICANT CHANGE UP (ref 70–99)
HCG SERPL-ACNC: <5 MIU/ML — SIGNIFICANT CHANGE UP
HCT VFR BLD CALC: 36.6 % — SIGNIFICANT CHANGE UP (ref 34.5–45)
HGB BLD-MCNC: 12.4 G/DL — SIGNIFICANT CHANGE UP (ref 11.5–15.5)
IANC: 5.02 K/UL — SIGNIFICANT CHANGE UP (ref 1.8–7.4)
IMM GRANULOCYTES NFR BLD AUTO: 0.1 % — SIGNIFICANT CHANGE UP (ref 0–1.5)
LYMPHOCYTES # BLD AUTO: 4.06 K/UL — HIGH (ref 1–3.3)
LYMPHOCYTES # BLD AUTO: 40.7 % — SIGNIFICANT CHANGE UP (ref 13–44)
MCHC RBC-ENTMCNC: 29.7 PG — SIGNIFICANT CHANGE UP (ref 27–34)
MCHC RBC-ENTMCNC: 33.9 GM/DL — SIGNIFICANT CHANGE UP (ref 32–36)
MCV RBC AUTO: 87.8 FL — SIGNIFICANT CHANGE UP (ref 80–100)
MONOCYTES # BLD AUTO: 0.59 K/UL — SIGNIFICANT CHANGE UP (ref 0–0.9)
MONOCYTES NFR BLD AUTO: 5.9 % — SIGNIFICANT CHANGE UP (ref 2–14)
NEUTROPHILS # BLD AUTO: 5.02 K/UL — SIGNIFICANT CHANGE UP (ref 1.8–7.4)
NEUTROPHILS NFR BLD AUTO: 50.3 % — SIGNIFICANT CHANGE UP (ref 43–77)
NRBC # BLD: 0 /100 WBCS — SIGNIFICANT CHANGE UP
NRBC # FLD: 0 K/UL — SIGNIFICANT CHANGE UP
PLATELET # BLD AUTO: 274 K/UL — SIGNIFICANT CHANGE UP (ref 150–400)
POTASSIUM SERPL-MCNC: 4 MMOL/L — SIGNIFICANT CHANGE UP (ref 3.5–5.3)
POTASSIUM SERPL-SCNC: 4 MMOL/L — SIGNIFICANT CHANGE UP (ref 3.5–5.3)
PROT SERPL-MCNC: 6.7 G/DL — SIGNIFICANT CHANGE UP (ref 6–8.3)
RBC # BLD: 4.17 M/UL — SIGNIFICANT CHANGE UP (ref 3.8–5.2)
RBC # FLD: 12.7 % — SIGNIFICANT CHANGE UP (ref 10.3–14.5)
SODIUM SERPL-SCNC: 140 MMOL/L — SIGNIFICANT CHANGE UP (ref 135–145)
WBC # BLD: 9.98 K/UL — SIGNIFICANT CHANGE UP (ref 3.8–10.5)
WBC # FLD AUTO: 9.98 K/UL — SIGNIFICANT CHANGE UP (ref 3.8–10.5)

## 2022-06-09 PROCEDURE — 70450 CT HEAD/BRAIN W/O DYE: CPT | Mod: 26,MA

## 2022-06-09 PROCEDURE — 99285 EMERGENCY DEPT VISIT HI MDM: CPT

## 2022-06-09 RX ORDER — SODIUM CHLORIDE 9 MG/ML
1000 INJECTION INTRAMUSCULAR; INTRAVENOUS; SUBCUTANEOUS ONCE
Refills: 0 | Status: COMPLETED | OUTPATIENT
Start: 2022-06-09 | End: 2022-06-09

## 2022-06-09 RX ORDER — METOCLOPRAMIDE HCL 10 MG
10 TABLET ORAL ONCE
Refills: 0 | Status: COMPLETED | OUTPATIENT
Start: 2022-06-09 | End: 2022-06-09

## 2022-06-09 RX ORDER — ACETAMINOPHEN 500 MG
975 TABLET ORAL ONCE
Refills: 0 | Status: COMPLETED | OUTPATIENT
Start: 2022-06-09 | End: 2022-06-09

## 2022-06-09 RX ADMIN — Medication 10 MILLIGRAM(S): at 22:08

## 2022-06-09 RX ADMIN — SODIUM CHLORIDE 1000 MILLILITER(S): 9 INJECTION INTRAMUSCULAR; INTRAVENOUS; SUBCUTANEOUS at 22:08

## 2022-06-09 RX ADMIN — Medication 975 MILLIGRAM(S): at 22:07

## 2022-06-09 NOTE — ED ADULT NURSE NOTE - OBJECTIVE STATEMENT
Received pt in bed A and Ox3 in NAD PERRLA extremities are strong and equal B/L, pt c/o photophobia, HA diffused, nausea, denies vomiting, changes in vision, sensory deficit. no obvious deformity noted pt the head, no bleeding, IV initiated labs drawn and sent, medicated as per order.

## 2022-06-09 NOTE — ED ADULT NURSE NOTE - CHIEF COMPLAINT QUOTE
Pt c/o headache and dizziness x 2 hrs. pt was cleaning and hit right side of her head hard in the under side of a counter top. pt denies LOC or any blood thinner use. pt state she feels out of it, slow and dizzy with tingling to right side of head.. pt No facial droop or slurred speech noted. Pt has equal strength, motor, and sensation to bilateral upper and lower extremities. No drifts noted to bilateral upper and lower extremities. No complaints of chest pain, nausea, vomiting  SOB, fever, chills verbalized.

## 2022-06-09 NOTE — ED PROVIDER NOTE - NSFOLLOWUPINSTRUCTIONS_ED_ALL_ED_FT
- Lab and imaging results, if performed, were discussed with you along with your discharge diagnosis    - Follow-up with the [facility] Address: [id number], [place] [address] Phone: [phone]    - Follow up with your doctor in 1 week - bring copies of your results with you    - Return to the ED for any new, worsening, or concerning symptoms to you    - Continue all prescribed medications    - Take ibuprofen/tylenol as directed as needed for pain  To control your pain at home, you should take Ibuprofen 400 mg along with Tylenol 650mg-1000mg every 6 to 8 hours. Limit your maximum daily Tylenol from all sources to 4000mg. Be aware that many other medications contain acetaminophen which is also known as Tylenol. Taking Tylenol and Ibuprofen together has been shown to be more effective at relieving pain than taking them separately. These are both over the counter medications that you can  at your local pharmacy without a prescription. You need to respect all of the warnings on the bottles. You shouldn’t take these medications for more than a week without following up with your doctor. Both medications come with certain risks and side effects that you need to discuss with your doctor, especially if you are taking them for a prolonged period.    - Rest and keep yourself hydrated with fluids

## 2022-06-09 NOTE — ED PROVIDER NOTE - PROGRESS NOTE DETAILS
Madhu GUTIÉRREZ (PGY-3):  Discussed strict return precautions and follow-up instructions. Patient is agreeable with plan, addressed all questions and concerns at this time. Will DC with concussion clinic follow-up.

## 2022-06-09 NOTE — ED PROVIDER NOTE - PHYSICAL EXAMINATION
GEN - NAD; well appearing; A+O x3   HEAD - NC/AT, no hematoma, no tenderness, no bogginess  EYES- PERRL, EOMI  ENT: Airway patent, mmm, Oral cavity and pharynx normal. No inflammation, swelling, exudate, or lesions.    NECK: Neck supple, no midline or paraspinal ttp, FROM  PULMONARY - CTA b/l, symmetric breath sounds.   CARDIAC -s1s2, RRR, no M,G,R  ABDOMEN - +BS, ND, NT, soft, no guarding, no rebound, no masses   BACK - no CVA tenderness, Normal  spine   EXTREMITIES - FROM, no deformity   SKIN - no rash or bruising   NEUROLOGIC - ao3, cn2-12 intact, 5/5 strength in all extremities, sensation grossly intact, f-n nl  PSYCH -nl mood/affect, nl insight.

## 2022-06-09 NOTE — ED PROVIDER NOTE - NS ED ROS FT
ROS:  GENERAL: No fever, no chills  EYES: no change in vision  HEENT: no trouble swallowing, no trouble speaking  CARDIAC: no chest pain  PULMONARY: no cough, no shortness of breath  GI: no abdominal pain, no nausea, no vomiting, no diarrhea, no constipation  : No dysuria, no frequency, no change in appearance, or odor of urine  SKIN: no rashes  NEURO: +r head pressure, +foggy/whoozy feeling, +photophobia. no numbness/weakness  MSK: No joint pain

## 2022-06-09 NOTE — ED PROVIDER NOTE - CLINICAL SUMMARY MEDICAL DECISION MAKING FREE TEXT BOX
41f presents to the ED s/p head trauma with wooziness, photophobia, nausea, discomfort to r. side of head/face-s/p hitting her head on counter above her. Was well prior to injury. No obvious external injury, neuro exam normal, no neck pain, no other complaints. ON exam appears nontoxic, ao3, no skull trauma noted-unlabored breathing, abd soft/nt, no deficits noted on cranial nerve exam-suspect concussion-will check ct/labs to eval for elec disturbance, organ dysfunction, ct head to eval for ich, treat symptoms.

## 2022-06-09 NOTE — ED PROVIDER NOTE - NSICDXPASTSURGICALHX_GEN_ALL_CORE_FT
PAST SURGICAL HISTORY:  H/O ovarian cystectomy many yrs ago, from right ovary    Missed   and so had D&C    S/P laparoscopic cholecystectomy  for gall stones

## 2022-06-09 NOTE — ED PROVIDER NOTE - PATIENT PORTAL LINK FT
You can access the FollowMyHealth Patient Portal offered by [facility] by registering at the following website: [email or web address]. By joining [email or web address]’s FollowMyHealth portal, you will also be able to view your health information using other applications (apps) compatible with our system.

## 2022-06-09 NOTE — ED PROVIDER NOTE - OBJECTIVE STATEMENT
41f presents to the ED with head injury. Patient was under counter-didnt realize how far under she was and stood to get up-hit her head on counter above her-no loc but started to feel out of it, foggy, with discomfort to r. side of head where she hit it and tingling to r. side of face, as well as photophobia and nausea. Was well and at baseline soh prior to this injury. No numbness to face or ext, no weakness, no imbalance or confusion. Has h/o thyroid CA s/p thyroidectomy, no ac.

## 2022-06-09 NOTE — ED ADULT TRIAGE NOTE - CHIEF COMPLAINT QUOTE
Pt c/o headache and dizziness x 2 hrs. pt was cleaning and hit her head hard in the under side of a counter top. pt denies LOC or any blood thinner use. pt state she feels out of it, slow and dizzy. No facial droop or slurred speech noted. Pt has equal strength, motor, and sensation to bilateral upper and lower extremities. No drifts noted to bilateral upper and lower extremities. No complaints of chest pain, nausea, vomiting  SOB, fever, chills verbalized. Pt c/o headache and dizziness x 2 hrs. pt was cleaning and hit right side of her head hard in the under side of a counter top. pt denies LOC or any blood thinner use. pt state she feels out of it, slow and dizzy with tingling to right side of head.. pt No facial droop or slurred speech noted. Pt has equal strength, motor, and sensation to bilateral upper and lower extremities. No drifts noted to bilateral upper and lower extremities. No complaints of chest pain, nausea, vomiting  SOB, fever, chills verbalized.

## 2022-09-15 ENCOUNTER — RESULT REVIEW (OUTPATIENT)
Age: 42
End: 2022-09-15

## 2023-06-24 ENCOUNTER — NON-APPOINTMENT (OUTPATIENT)
Age: 43
End: 2023-06-24

## 2023-06-25 ENCOUNTER — EMERGENCY (EMERGENCY)
Facility: HOSPITAL | Age: 43
LOS: 1 days | Discharge: HOME | End: 2023-06-25
Attending: EMERGENCY MEDICINE
Payer: COMMERCIAL

## 2023-06-25 VITALS
OXYGEN SATURATION: 100 % | RESPIRATION RATE: 16 BRPM | HEART RATE: 65 BPM | SYSTOLIC BLOOD PRESSURE: 120 MMHG | TEMPERATURE: 98 F | DIASTOLIC BLOOD PRESSURE: 80 MMHG

## 2023-06-25 VITALS
OXYGEN SATURATION: 100 % | WEIGHT: 164.91 LBS | DIASTOLIC BLOOD PRESSURE: 80 MMHG | HEIGHT: 66 IN | TEMPERATURE: 98 F | HEART RATE: 68 BPM | RESPIRATION RATE: 18 BRPM | SYSTOLIC BLOOD PRESSURE: 117 MMHG

## 2023-06-25 DIAGNOSIS — Z98.89 OTHER SPECIFIED POSTPROCEDURAL STATES: Chronic | ICD-10-CM

## 2023-06-25 DIAGNOSIS — O02.1 MISSED ABORTION: Chronic | ICD-10-CM

## 2023-06-25 LAB
ALBUMIN SERPL ELPH-MCNC: 4.7 G/DL — SIGNIFICANT CHANGE UP (ref 3.3–5)
ALP SERPL-CCNC: 79 U/L — SIGNIFICANT CHANGE UP (ref 40–120)
ALT FLD-CCNC: 10 U/L — SIGNIFICANT CHANGE UP (ref 10–45)
ANION GAP SERPL CALC-SCNC: 12 MMOL/L — SIGNIFICANT CHANGE UP (ref 5–17)
APPEARANCE UR: CLEAR — SIGNIFICANT CHANGE UP
AST SERPL-CCNC: 12 U/L — SIGNIFICANT CHANGE UP (ref 10–40)
BACTERIA # UR AUTO: NEGATIVE — SIGNIFICANT CHANGE UP
BASE EXCESS BLDV CALC-SCNC: -1.6 MMOL/L — SIGNIFICANT CHANGE UP (ref -2–3)
BASOPHILS # BLD AUTO: 0.04 K/UL — SIGNIFICANT CHANGE UP (ref 0–0.2)
BASOPHILS NFR BLD AUTO: 0.5 % — SIGNIFICANT CHANGE UP (ref 0–2)
BILIRUB SERPL-MCNC: 0.5 MG/DL — SIGNIFICANT CHANGE UP (ref 0.2–1.2)
BILIRUB UR-MCNC: NEGATIVE — SIGNIFICANT CHANGE UP
BUN SERPL-MCNC: 8 MG/DL — SIGNIFICANT CHANGE UP (ref 7–23)
CA-I SERPL-SCNC: 1.17 MMOL/L — SIGNIFICANT CHANGE UP (ref 1.15–1.33)
CALCIUM SERPL-MCNC: 9.1 MG/DL — SIGNIFICANT CHANGE UP (ref 8.4–10.5)
CHLORIDE BLDV-SCNC: 102 MMOL/L — SIGNIFICANT CHANGE UP (ref 96–108)
CHLORIDE SERPL-SCNC: 103 MMOL/L — SIGNIFICANT CHANGE UP (ref 96–108)
CO2 BLDV-SCNC: 27 MMOL/L — HIGH (ref 22–26)
CO2 SERPL-SCNC: 25 MMOL/L — SIGNIFICANT CHANGE UP (ref 22–31)
COLOR SPEC: COLORLESS — SIGNIFICANT CHANGE UP
CREAT SERPL-MCNC: 0.59 MG/DL — SIGNIFICANT CHANGE UP (ref 0.5–1.3)
DIFF PNL FLD: ABNORMAL
EGFR: 115 ML/MIN/1.73M2 — SIGNIFICANT CHANGE UP
EOSINOPHIL # BLD AUTO: 0.13 K/UL — SIGNIFICANT CHANGE UP (ref 0–0.5)
EOSINOPHIL NFR BLD AUTO: 1.5 % — SIGNIFICANT CHANGE UP (ref 0–6)
EPI CELLS # UR: 1 /HPF — SIGNIFICANT CHANGE UP
GAS PNL BLDV: 136 MMOL/L — SIGNIFICANT CHANGE UP (ref 136–145)
GAS PNL BLDV: SIGNIFICANT CHANGE UP
GLUCOSE BLDV-MCNC: 87 MG/DL — SIGNIFICANT CHANGE UP (ref 70–99)
GLUCOSE SERPL-MCNC: 89 MG/DL — SIGNIFICANT CHANGE UP (ref 70–99)
GLUCOSE UR QL: NEGATIVE — SIGNIFICANT CHANGE UP
HCG SERPL-ACNC: <2 MIU/ML — SIGNIFICANT CHANGE UP
HCO3 BLDV-SCNC: 25 MMOL/L — SIGNIFICANT CHANGE UP (ref 22–29)
HCT VFR BLD CALC: 37.7 % — SIGNIFICANT CHANGE UP (ref 34.5–45)
HCT VFR BLDA CALC: 38 % — SIGNIFICANT CHANGE UP (ref 34.5–46.5)
HGB BLD CALC-MCNC: 12.7 G/DL — SIGNIFICANT CHANGE UP (ref 11.7–16.1)
HGB BLD-MCNC: 12 G/DL — SIGNIFICANT CHANGE UP (ref 11.5–15.5)
HYALINE CASTS # UR AUTO: 0 /LPF — SIGNIFICANT CHANGE UP (ref 0–2)
IMM GRANULOCYTES NFR BLD AUTO: 0.4 % — SIGNIFICANT CHANGE UP (ref 0–0.9)
KETONES UR-MCNC: NEGATIVE — SIGNIFICANT CHANGE UP
LACTATE BLDV-MCNC: 2 MMOL/L — SIGNIFICANT CHANGE UP (ref 0.5–2)
LEUKOCYTE ESTERASE UR-ACNC: NEGATIVE — SIGNIFICANT CHANGE UP
LIDOCAIN IGE QN: 27 U/L — SIGNIFICANT CHANGE UP (ref 7–60)
LYMPHOCYTES # BLD AUTO: 2.4 K/UL — SIGNIFICANT CHANGE UP (ref 1–3.3)
LYMPHOCYTES # BLD AUTO: 28.4 % — SIGNIFICANT CHANGE UP (ref 13–44)
MCHC RBC-ENTMCNC: 28 PG — SIGNIFICANT CHANGE UP (ref 27–34)
MCHC RBC-ENTMCNC: 31.8 GM/DL — LOW (ref 32–36)
MCV RBC AUTO: 87.9 FL — SIGNIFICANT CHANGE UP (ref 80–100)
MONOCYTES # BLD AUTO: 0.46 K/UL — SIGNIFICANT CHANGE UP (ref 0–0.9)
MONOCYTES NFR BLD AUTO: 5.4 % — SIGNIFICANT CHANGE UP (ref 2–14)
NEUTROPHILS # BLD AUTO: 5.4 K/UL — SIGNIFICANT CHANGE UP (ref 1.8–7.4)
NEUTROPHILS NFR BLD AUTO: 63.8 % — SIGNIFICANT CHANGE UP (ref 43–77)
NITRITE UR-MCNC: NEGATIVE — SIGNIFICANT CHANGE UP
NRBC # BLD: 0 /100 WBCS — SIGNIFICANT CHANGE UP (ref 0–0)
PCO2 BLDV: 50 MMHG — HIGH (ref 39–42)
PH BLDV: 7.31 — LOW (ref 7.32–7.43)
PH UR: 6 — SIGNIFICANT CHANGE UP (ref 5–8)
PLATELET # BLD AUTO: 322 K/UL — SIGNIFICANT CHANGE UP (ref 150–400)
PO2 BLDV: 22 MMHG — LOW (ref 25–45)
POTASSIUM BLDV-SCNC: 3.7 MMOL/L — SIGNIFICANT CHANGE UP (ref 3.5–5.1)
POTASSIUM SERPL-MCNC: 3.7 MMOL/L — SIGNIFICANT CHANGE UP (ref 3.5–5.3)
POTASSIUM SERPL-SCNC: 3.7 MMOL/L — SIGNIFICANT CHANGE UP (ref 3.5–5.3)
PROT SERPL-MCNC: 7.3 G/DL — SIGNIFICANT CHANGE UP (ref 6–8.3)
PROT UR-MCNC: ABNORMAL
RBC # BLD: 4.29 M/UL — SIGNIFICANT CHANGE UP (ref 3.8–5.2)
RBC # FLD: 13.5 % — SIGNIFICANT CHANGE UP (ref 10.3–14.5)
RBC CASTS # UR COMP ASSIST: 162 /HPF — HIGH (ref 0–4)
SAO2 % BLDV: 25.3 % — LOW (ref 67–88)
SODIUM SERPL-SCNC: 140 MMOL/L — SIGNIFICANT CHANGE UP (ref 135–145)
SP GR SPEC: 1.01 — LOW (ref 1.01–1.02)
UROBILINOGEN FLD QL: NEGATIVE — SIGNIFICANT CHANGE UP
WBC # BLD: 8.46 K/UL — SIGNIFICANT CHANGE UP (ref 3.8–10.5)
WBC # FLD AUTO: 8.46 K/UL — SIGNIFICANT CHANGE UP (ref 3.8–10.5)
WBC UR QL: 1 /HPF — SIGNIFICANT CHANGE UP (ref 0–5)

## 2023-06-25 PROCEDURE — 82947 ASSAY GLUCOSE BLOOD QUANT: CPT

## 2023-06-25 PROCEDURE — 99284 EMERGENCY DEPT VISIT MOD MDM: CPT

## 2023-06-25 PROCEDURE — 83605 ASSAY OF LACTIC ACID: CPT

## 2023-06-25 PROCEDURE — 87086 URINE CULTURE/COLONY COUNT: CPT

## 2023-06-25 PROCEDURE — 96375 TX/PRO/DX INJ NEW DRUG ADDON: CPT

## 2023-06-25 PROCEDURE — 84702 CHORIONIC GONADOTROPIN TEST: CPT

## 2023-06-25 PROCEDURE — 85025 COMPLETE CBC W/AUTO DIFF WBC: CPT

## 2023-06-25 PROCEDURE — 85018 HEMOGLOBIN: CPT

## 2023-06-25 PROCEDURE — 85014 HEMATOCRIT: CPT

## 2023-06-25 PROCEDURE — 82435 ASSAY OF BLOOD CHLORIDE: CPT

## 2023-06-25 PROCEDURE — 96374 THER/PROPH/DIAG INJ IV PUSH: CPT | Mod: XU

## 2023-06-25 PROCEDURE — 82803 BLOOD GASES ANY COMBINATION: CPT

## 2023-06-25 PROCEDURE — 99284 EMERGENCY DEPT VISIT MOD MDM: CPT | Mod: 25

## 2023-06-25 PROCEDURE — 81001 URINALYSIS AUTO W/SCOPE: CPT

## 2023-06-25 PROCEDURE — 84295 ASSAY OF SERUM SODIUM: CPT

## 2023-06-25 PROCEDURE — 80053 COMPREHEN METABOLIC PANEL: CPT

## 2023-06-25 PROCEDURE — 83690 ASSAY OF LIPASE: CPT

## 2023-06-25 PROCEDURE — 74177 CT ABD & PELVIS W/CONTRAST: CPT | Mod: MA

## 2023-06-25 PROCEDURE — 84132 ASSAY OF SERUM POTASSIUM: CPT

## 2023-06-25 PROCEDURE — 82330 ASSAY OF CALCIUM: CPT

## 2023-06-25 PROCEDURE — 74177 CT ABD & PELVIS W/CONTRAST: CPT | Mod: 26,MA

## 2023-06-25 RX ORDER — ACETAMINOPHEN 500 MG
650 TABLET ORAL ONCE
Refills: 0 | Status: COMPLETED | OUTPATIENT
Start: 2023-06-25 | End: 2023-06-25

## 2023-06-25 RX ORDER — ONDANSETRON 8 MG/1
4 TABLET, FILM COATED ORAL ONCE
Refills: 0 | Status: COMPLETED | OUTPATIENT
Start: 2023-06-25 | End: 2023-06-25

## 2023-06-25 RX ORDER — FAMOTIDINE 10 MG/ML
20 INJECTION INTRAVENOUS ONCE
Refills: 0 | Status: COMPLETED | OUTPATIENT
Start: 2023-06-25 | End: 2023-06-25

## 2023-06-25 RX ORDER — KETOROLAC TROMETHAMINE 30 MG/ML
15 SYRINGE (ML) INJECTION ONCE
Refills: 0 | Status: DISCONTINUED | OUTPATIENT
Start: 2023-06-25 | End: 2023-06-25

## 2023-06-25 RX ORDER — SODIUM CHLORIDE 9 MG/ML
1000 INJECTION INTRAMUSCULAR; INTRAVENOUS; SUBCUTANEOUS ONCE
Refills: 0 | Status: COMPLETED | OUTPATIENT
Start: 2023-06-25 | End: 2023-06-25

## 2023-06-25 RX ADMIN — ONDANSETRON 4 MILLIGRAM(S): 8 TABLET, FILM COATED ORAL at 13:28

## 2023-06-25 RX ADMIN — SODIUM CHLORIDE 1000 MILLILITER(S): 9 INJECTION INTRAMUSCULAR; INTRAVENOUS; SUBCUTANEOUS at 13:28

## 2023-06-25 RX ADMIN — Medication 650 MILLIGRAM(S): at 14:00

## 2023-06-25 RX ADMIN — Medication 15 MILLIGRAM(S): at 15:52

## 2023-06-25 RX ADMIN — FAMOTIDINE 20 MILLIGRAM(S): 10 INJECTION INTRAVENOUS at 13:29

## 2023-06-25 RX ADMIN — Medication 650 MILLIGRAM(S): at 13:29

## 2023-06-25 NOTE — ED PROVIDER NOTE - PHYSICAL EXAMINATION
Gen: NAD, non-toxic appearing  Head: normal appearing  HEENT: normal conjunctiva, oral mucosa moist  Lung: no respiratory distress, speaking in full sentences, CTA b/l     CV: regular rate and rhythm, no murmurs  Abd: soft, non distended,  Right-sided abdomen tender to palpation right lower quadrant tender to palpation  MSK: no visible deformities no cva ttp  Neuro: No focal deficits, AAOx3  Skin: Warm  Psych: normal affect
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
## 2023-06-25 NOTE — ED PROVIDER NOTE - PATIENT PORTAL LINK FT
You can access the FollowMyHealth Patient Portal offered by [facility] by registering at the following website: [email or web address]. By joining [employer]’s FollowMyHealth portal, you will also be able to view your health information using other applications (apps) compatible with our system.

## 2023-06-25 NOTE — ED ADULT NURSE NOTE - OBJECTIVE STATEMENT
42y F, full code, presenting with RU/LQ pain radiating to back (7/10 on pain scale). pt appears to be in pain, tearful. LMP 6/24/2023. PMHx hypothyroidism synthroid). pt is AOx4, continent x 2, independent.  No complaints of chest pain, SOB, F/C, N/V/D at this time. Denies urinary symptoms. Bed locked and at lowest position. Pt oriented to call bell system and informed to ask for assistance if needed.

## 2023-06-25 NOTE — ED ADULT NURSE NOTE - CHIEF COMPLAINT
The patient is a 42y Female complaining of abdominal pain. Dutasteride Counseling: Dustasteride Counseling:  I discussed with the patient the risks of use of dutasteride including but not limited to decreased libido, decreased ejaculate volume, and gynecomastia. Women who can become pregnant should not handle medication.  All of the patient's questions and concerns were addressed. Dutasteride Male Counseling: Dustasteride Counseling:  I discussed with the patient the risks of use of dutasteride including but not limited to decreased libido, decreased ejaculate volume, and gynecomastia. Women who can become pregnant should not handle medication.  All of the patient's questions and concerns were addressed.

## 2023-06-25 NOTE — ED ADULT NURSE NOTE - NSFALLUNIVINTERV_ED_ALL_ED
Bed/Stretcher in lowest position, wheels locked, appropriate side rails in place/Call bell, personal items and telephone in reach/Instruct patient to call for assistance before getting out of bed/chair/stretcher/Non-slip footwear applied when patient is off stretcher/[place] to call system/Physically safe environment - no spills, clutter or unnecessary equipment/Purposeful proactive rounding/Room/bathroom lighting operational, light cord in reach

## 2023-06-25 NOTE — ED PROVIDER NOTE - OBJECTIVE STATEMENT
42-year-old female past medical history thyroid CA status post thyroidectomy history of cholecystectomy Presents with 1 day of right-sided abdominal pain.  Pain is sharp right upper and lower quadrants intermittent.  Not associated with food.  Associated with nausea and multiple episodes of nonbloody emesis.  Not able to tolerate p.o. since.  No fevers chills.  Last bowel movement 2 days ago regular nonbloody.  1 episode of diarrhea 3 days ago.  LMP current.  No other vaginal discharge.  No dysuria hematuria urinary frequency.

## 2023-06-25 NOTE — ED PROVIDER NOTE - PROGRESS NOTE DETAILS
Samantha WAGGONER PGY2: CT with colitis. pt pain improved after meds. able to tolerate po. other labs and imaging negative for emergent pathology at this time. safe for gi f.u and strict return precautions. pt in agreement with plan.

## 2023-06-25 NOTE — ED ADULT NURSE NOTE - TEMPERATURE IN CELSIUS (DEGREES C)
Initial Nursing Home Visit/Nursing Home History & Physical    Location seen at: [facility], Fax#: [phone];   [address] Fax#: [phone]   Room 103    Skilled nursing facility [subacute rehab]: Yes  Date of Service: 11/13/2019     Primary Care Physician: Joselito Diaz MD  Attending at Nursing Home: Espinoza Camacho MD  Seen by Espinoza Camacho MD  today.     Chief Complaint   Patient presents with   • Skilled Nursing Home Physician Admission     S/P Left total knee replacement, HTN, CAD      Subacute / Skilled Need: Rehabilitation  Code Status: CODE: Full resuscitation  HCPOA in Facility Chart: No, pending receipt from hospital    Pool Talavera is a 73 year old male presenting to subacute rehab after a hospital stay for:  [FROM DISCHARGE SUMMARY]:  OPERATIONS AND PROCEDURES:  ARTHROPLASTY, KNEE, TOTAL     CONSULTANTS:  hospitalist consulted for comangement     COMPLICATIONS:  Acute blood loss anemia     BRIEF HISTORY OF PRESENT ILLNESS:  Pool Talavera is a 73 year old male presenting for left total knee arthroplasty. He failed conservative treatment and now elects to proceed with surgery.      Social History            Tobacco Use   • Smoking status: Never Smoker   • Smokeless tobacco: Never Used   Substance Use Topics   • Alcohol use: Not Currently       Alcohol/week: 10.0 standard drinks       Types: 10 Shots of liquor per week       Frequency: Never       Drinks per session: 1 or 2       Binge frequency: Never   • Drug use: No        ALLERGIES:  No Known Allergies     For further information, please see admission H and P.     HOSPITAL COURSE:  The patient was seen preoperatively by Joselito Diaz MD and was taken to  the operating room where 56016 - Total Knee Arthroplasty Left  was performed.  The patient tolerated the procedure, 2 grams of Ancef was given for infection prophylaxis.  Pt was transferred to PACU and then ortho floor.  PT and OT were ordered and is Weight bearing as  tolerated.  Eliquis resumed for DVT prophylaxis.  TEDS and SCDS utilized as well.  Dressing change on POD 2.  Postop pain was controlled with percocet.      Patient was noted to be hypotensive the evening of surgery with lightheadedness, saline bolus administered along with albumin and midodrine. Hemoglobin decreased to 7.4 on POD1 with complaints of dizziness, 1 unit of PRBC administered.    HISTORY OF PRESENT ILLNESS: having trouble swallowing foods - had been diagnosed with esophageal cancer earlier this year.  Has had esophageal dilations, but they only last 1 week.   Has trouble swallowing foods due to this.      Past Medical History:   Diagnosis Date   • Anemia    • Aortic valve regurgitation    • Arthritis     generalized, spine   • Ascending aortic aneurysm (CMS/HCC)    • Blood clot associated with vein wall inflammation 03/2019    pulmonary emboli   • Bronchitis    • Cerebrovascular accident (CMS/HCC)     20 years ago, no residual   • Chronic pain     neck, knee's, shoulders arms arthritis   • Chronic renal impairment, stage 2 (mild) 10/9/2015   • Colon polyp    • Diverticulosis of colon    • Dysphagia    • Esophageal adenocarcinoma (CMS/HCC) 3/19/2019   • Fracture     cartilage of nose   • H/O esophagectomy 7/10/2019   • High cholesterol    • HTN (hypertension)    • Pneumonia     \"walking\" pneumonia   • S/P aorta repair 2/27/2019   • S/P AVR (aortic valve replacement) 2/27/2019   • S/P left atrial appendage ligation 2/27/2019   • S/P pericardial window creation 3/15/2019   • S/P robotic cholecystectomy 7/10/2019   • s/p robotic transhiatal esophagectomy with transcervical endoscopic esophageal mobilization  7/10/2019   • Sleep apnea     no CPAP   • 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 Rendon: Anastomotic stricture noted at the site of esophageal gastric anastomosis in the setting of a gastric pull-up surgery. Balloon dilation performed up to 14 mm    • Eye surgery      left cataract surgery    • Fine needle aspiration Left 6/24/2010    Left neck mass   • Gi endoscopic ultrasound  03/08/2019    Dr. Rendon: Distal esophageal nodular mass with an EUS stage of at least T1b (possible T2) Nx MX. The distal paraesophageal lymph node was located posterior to the mass and could not be sampled. However the EUS features other than the size were concerning for possible involvement.   • Inguinal hernia repair Left 09/30/2019   • Mediport insertion, single Right 04/15/2019   • Pericardial window  03/15/2019   • Selective neck dissection Bilateral 8/18/2010    Levels 1,2,3 on each side   • Skin biopsy      L & R arm, chin, Left lower lip, forehead   • Tonsillectomy and adenoidectomy          Current Outpatient Medications   Medication Sig Dispense Refill   • apixaBAN (ELIQUIS) 5 MG Tab Take 1 tablet by mouth every 12 hours. 60 tablet 1   • oxyCODONE-acetaminophen (PERCOCET) 5-325 MG per tablet Take 1-2 tablets by mouth every 4 hours as needed for Pain. 40 tablet 0   • tamsulosin (FLOMAX) 0.4 MG Cap Take 1 capsule by mouth daily after a meal. 30 capsule 3   • levothyroxine (SYNTHROID, LEVOTHROID) 88 MCG tablet Take 1 tablet by mouth daily. 90 tablet 0   • metoPROLOL tartrate (LOPRESSOR) 25 MG tablet Take 0.5 tablets by mouth every 12 hours. (Patient taking differently: Take 25 mg by mouth daily. ) 60 tablet 2   • potassium CHLORIDE (KLOR-CON) 20 MEQ packet Take 1 packet by mouth daily (with breakfast). 30 packet 0    • allopurinol (ZYLOPRIM) 300 MG tablet Take 1 tablet by mouth daily. (Patient taking differently: Take 300 mg by mouth daily as needed. ) 90 tablet 1   • atorvastatin (LIPITOR) 20 MG tablet Take 1 tablet by mouth daily. 90 tablet 1   • pantoprazole (PROTONIX) 40 MG tablet Take 1 tablet by mouth daily. 90 tablet 2   • furosemide (LASIX) 40 MG tablet Take 1 tablet by mouth daily. 30 tablet 2   • aspirin 81 MG chewable tablet Chew 1 tablet by mouth daily. 30 tablet 2   • Cholecalciferol (VITAMIN D3) 5000 UNITS CAPS Take 2 tablets by mouth daily. 2 TABS (= 10,000 UNITS)       No current facility-administered medications for this visit.        ALLERGIES:  No Known Allergies   Social History     Tobacco Use   • Smoking status: Never Smoker   • Smokeless tobacco: Never Used   Substance Use Topics   • Alcohol use: Not Currently     Alcohol/week: 10.0 standard drinks     Types: 10 Shots of liquor per week     Frequency: Never     Drinks per session: 1 or 2     Binge frequency: Never      Family History   Problem Relation Age of Onset   • Cancer Mother    • Stroke Mother    • Dementia/Alzheimers Mother    • Cancer Father         throat cancer   • Alcohol Abuse Father    • Anxiety disorder Brother         REVIEW OF SYSTEMS:  Review of Systems   Constitutional: Negative for appetite change (Appetite is good, but unable to eat what he wants. ).   HENT: Positive for trouble swallowing (Since esophageal cancer diagnosed early this year. ). Negative for hearing loss.    Eyes: Negative for visual disturbance.   Respiratory: Negative for cough and shortness of breath.    Cardiovascular: Negative for chest pain and palpitations.   Gastrointestinal: Negative for abdominal distention, abdominal pain, constipation, diarrhea, nausea and vomiting.   Endocrine: Negative for polydipsia, polyphagia and polyuria.   Genitourinary: Positive for frequency (due to enlarged prostate.). Negative for difficulty urinating.   Musculoskeletal:  Positive for back pain (chronic Left sided sciatica ).   Neurological: Positive for light-headedness (with neck extension.). Negative for dizziness, weakness, numbness and headaches.   Psychiatric/Behavioral: Positive for sleep disturbance (Chronic sleep issues. ). Negative for dysphoric mood.     Sleeping Aid: No     Appetite: Normal    Bowel Movements:  regular bowel movements    FUNCTIONAL HISTORY:  Social History     Patient does not qualify to have social determinant information on file (likely too young).   Social History Narrative            11/13/2019:     Prior to Hospital:    Baseline Functional Status:  Independent, driving.    Baseline Cognitive Status:  AO x 3        Primary Power of  for Health Care Agent:      Primary Agent Contact Number:      Alternate Power of  for Health Care Agent:      Alternate Agent Contact Number:          Activated Power of  for Health Care Date:      Deactivation Date:          Code Status:  Full resuscitation        Social History: Lives alone, 2 story home.          Diet Consistency:  General   Diet Type:  regular    [place]:  No  Incontinent:  No    ===================      EXAM:  Facility vital signs reviewed in facility chart    Physical Exam  Constitutional:       General: He is not in acute distress.     Appearance: He is not ill-appearing.   HENT:      Mouth/Throat:      Mouth: Mucous membranes are moist.      Pharynx: No oropharyngeal exudate.   Eyes:      General: No scleral icterus.     Pupils: Pupils are equal, round, and reactive to light.   Neck:      Musculoskeletal: Normal range of motion and neck supple.   Cardiovascular:      Rate and Rhythm: Normal rate and regular rhythm.      Heart sounds: Normal heart sounds. No murmur.   Pulmonary:      Effort: Pulmonary effort is normal. No respiratory distress.      Breath sounds: Normal breath sounds.   Abdominal:      General: Bowel sounds are normal. There is no distension.      Palpations:  Abdomen is soft.      Tenderness: There is no tenderness. There is no guarding.   Musculoskeletal:      Right lower leg: No edema.      Left lower leg: Edema (+) 2 pitting. present.   Lymphadenopathy:      Cervical: No cervical adenopathy.   Skin:     General: Skin is warm and dry.   Neurological:      Mental Status: He is alert.   Psychiatric:         Behavior: Behavior normal.         Thought Content: Thought content normal.         Judgment: Judgment normal.         Assistive Device for Mobility:  YES      ===================    Patient Active Problem List   Diagnosis   • Hypertension   • Dyslipidemia   • Impaired fasting glucose   • Gout   • Benign prostatic hyperplasia with lower urinary tract symptoms   • Chronic pain   • Hypothyroidism   • Osteoarthrosis   • Stage 2 chronic kidney disease   • Closed traumatic PIP dislocation - right ring   • Knee effusion, left   • Internal derangement of right knee   • Ascending aortic aneurysm (CMS/HCC)   • Nonrheumatic aortic valve stenosis   • DJD R knee   • DJD L kne   • CVA (cerebrovascular accident) (CMS/HCC)   • History of hypothyroidism   • Obstructive sleep apnea syndrome   • Squamous cell carcinoma in situ (SCCIS) of skin   • Allergic bronchitis   • S/P AVR (aortic valve replacement)   • S/P aorta repair   • S/P left atrial appendage ligation   • S/P pericardial window creation   • Esophageal adenocarcinoma (CMS/HCC)   • Pulmonary emboli (CMS/HCC)   • H/O esophagectomy   • s/p robotic transhiatal esophagectomy with transcervical endoscopic esophageal mobilization    • S/P robotic cholecystectomy     VISIT DIAGNOSES:  1. Status post total left knee replacement    2. Esophageal adenocarcinoma (CMS/HCC)    3. s/p robotic transhiatal esophagectomy with transcervical endoscopic esophageal mobilization     4. Stricture and stenosis of esophagus    5. Essential hypertension        Impression/Plan:  1. Status post total left knee replacement  Continue PT (Physical Therapy)  and OT (Occupational Therapy) for strengthening and conditioning.     2. Esophageal adenocarcinoma (CMS/HCC)  3. s/p robotic transhiatal esophagectomy with transcervical endoscopic esophageal mobilization   4. Stricture and stenosis of esophagus  Will have dietary meet with him to determine which foods he is best able to swallow and provide him with them.     5. Essential hypertension  Stable, continue present treatment(s).     Discussed with or notes reviewed:  RN / Nursing, Patient, OT, PT, Speech Therapy and NP/PA      Electronically signed:  Espinoza Camacho MD   11/13/2019       36.7

## 2023-06-25 NOTE — ED ADULT NURSE NOTE - PAIN: PRESENCE, MLM
Received report from L&D nurse. Patient ambulated from wheelchair to bed. Patient unsteady on feet. Patient reports feeling exhausted. Patient asks questions about infant who is in NBN. Patient states that she has very bad cramping. Will continue to monitor.    complains of pain/discomfort

## 2023-06-27 LAB
CULTURE RESULTS: SIGNIFICANT CHANGE UP
SPECIMEN SOURCE: SIGNIFICANT CHANGE UP

## 2023-12-27 NOTE — ASU PATIENT PROFILE, ADULT - PAIN SCALE PREFERRED, PROFILE
numerical 0-10 " I have a mass on my lung, they're going to check what jolynn of cells in under the mass"

## 2024-03-13 NOTE — ED CDU PROVIDER INITIAL DAY NOTE - ENMT, MLM
Addended by: TATYANA BLAKE on: 3/13/2024 11:07 AM     Modules accepted: Orders     Airway patent. Nasal mucosa clear. Mouth with normal mucosa. Throat has no vesicles, no oropharyngeal exudates and uvula is midline.

## 2024-03-20 NOTE — ED PROVIDER NOTE - FAMILY HISTORY
Oriented - self; Oriented - place; Oriented - time
No pertinent family history in first degree relatives

## 2024-05-12 NOTE — ED CDU PROVIDER INITIAL DAY NOTE - PROGRESS NOTE ADDITIONAL1
Eat healthy foods you enjoy. Apixaban/Eliquis DOES NOT have a special diet. Limit your alcohol intake.
Additional Progress Note...

## 2024-11-30 ENCOUNTER — NON-APPOINTMENT (OUTPATIENT)
Age: 44
End: 2024-11-30

## 2024-12-02 NOTE — H&P PST ADULT - SYMPTOMS
Anesthesia Start and Stop Event Times       Date Time Event    12/2/2024 0812 Ready for Procedure     0815 Anesthesia Start     0902 Anesthesia Stop          Responsible Staff    No responsible staff documented.       Overtime Reason:  no overtime (within assigned shift)    Comments:                                                           none

## 2024-12-06 ENCOUNTER — NON-APPOINTMENT (OUTPATIENT)
Age: 44
End: 2024-12-06

## 2024-12-06 ENCOUNTER — APPOINTMENT (OUTPATIENT)
Dept: PULMONOLOGY | Facility: CLINIC | Age: 44
End: 2024-12-06
Payer: COMMERCIAL

## 2024-12-06 VITALS
WEIGHT: 173 LBS | HEART RATE: 72 BPM | SYSTOLIC BLOOD PRESSURE: 114 MMHG | DIASTOLIC BLOOD PRESSURE: 78 MMHG | HEIGHT: 65 IN | TEMPERATURE: 98 F | BODY MASS INDEX: 28.82 KG/M2 | OXYGEN SATURATION: 99 %

## 2024-12-06 DIAGNOSIS — R06.2 WHEEZING: ICD-10-CM

## 2024-12-06 DIAGNOSIS — R07.1 CHEST PAIN ON BREATHING: ICD-10-CM

## 2024-12-06 PROCEDURE — 99203 OFFICE O/P NEW LOW 30 MIN: CPT

## 2024-12-06 RX ORDER — PREDNISONE 20 MG/1
20 TABLET ORAL
Qty: 10 | Refills: 0 | Status: ACTIVE | COMMUNITY
Start: 2024-12-06 | End: 1900-01-01

## 2025-05-19 NOTE — ED ADULT TRIAGE NOTE - NS ED NURSE AMBULANCES
"Oncology Follow-Up Visit: May 20, 2025    Oncologist: Dr. Serra   PCP: Malachi Deutsch    Reason for Visit: Pre-treatment follow-up    Diagnosis: Follicular lymphoma, grade 1-2 with retroperitoneal involvement and possible serous involvement (ascites).  Status post inpatient cycle 1 Bendamustine with rituximab; rituximab complicated by infusion reaction.  Pulmonary embolism secondary to malignancy requiring chronic anticoagulation.  Retroperitoneal biopsy complicated by retroperitoneal hemorrhage in the setting of anticoagulation.  Tumor lysis syndrome status post rasburicase with ongoing allopurinol.  Pancytopenia, likely multifactorial secondary to chemotherapy, lymphoma, acute illness.    Interval History:  Pt is seen in-person accompanied by his daughter (Patria) for review prior to treatment. Overall, tolerating well with some fatigue after treatment. No N/V/C/D. Abdominal distention and fullness continues, although slightly improved. Scheduled for paracentesis tomorrow. Breathing becomes more labored with reyna abdomen. Appetite is intact and staying well hydrated. Mild neuropathy to L) leg. BLE edema that improves with compression and elevation. Taking furosemide BID, no disruption in sleep d/t nocturia. No rashes, breathing issues, or chest complaints. No additional questions.    Patient denies any of the following except if noted above: fevers, chills, difficulty with energy, vision or hearing changes, chest pain, dyspnea, abdominal pain, nausea, vomiting, diarrhea, constipation, urinary concerns, headaches, numbness, tingling, issues with sleep or mood. He also denies lumps, bumps, rashes or skin lesions, bleeding or bruising issues.    Physical Exam:  /72 (BP Location: Right arm, Patient Position: Sitting, Cuff Size: Adult Large)   Pulse 99   Temp 97.9  F (36.6  C) (Temporal)   Resp 17   Ht 1.702 m (5' 7\")   Wt 102.7 kg (226 lb 6 oz)   SpO2 98%   BMI 35.46 kg/m       BP Readings from " Last 6 Encounters:   05/20/25 126/72   05/12/25 122/72   05/07/25 123/70   04/23/25 116/66   04/23/25 122/75   04/22/25 134/72     Wt Readings from Last 6 Encounters:   05/20/25 102.7 kg (226 lb 6 oz)   05/12/25 99.3 kg (219 lb)   04/23/25 110.2 kg (243 lb)   04/22/25 108 kg (238 lb 3.2 oz)   04/14/25 98.9 kg (218 lb)   04/07/25 107 kg (236 lb)      Constitutional: No acute distress, pleasant, appropriately groomed.   ENT: PERRLA, sclera without erythema. Appropriate dentition.  Neck: Trachea midline, no adenopathy.   Resp: CTA, adequate depth and rate of respirations.   Cardiac: S1/S2, RRR, murmur heard loudest at R) sternal border.   Abdomen: BS diminished, abdomen nontender, distended and firm.   MS:  5/5 muscle strength, adequate ROM.   Skin: No rashes, lesions, or wounds on exposed skin.  Neuro: A/O x 4, sensation intact.   Lymph: No palpable anterior/posterior cervical, axillary, or supraclavicular nodes.   Psych: Appropriate mentation and affect.    Laboratory Results:   Results for orders placed or performed in visit on 05/20/25   Comprehensive metabolic panel     Status: Abnormal   Result Value Ref Range    Sodium 141 135 - 145 mmol/L    Potassium 3.6 3.4 - 5.3 mmol/L    Carbon Dioxide (CO2) 29 22 - 29 mmol/L    Anion Gap 8 7 - 15 mmol/L    Urea Nitrogen 11.0 8.0 - 23.0 mg/dL    Creatinine 0.90 0.67 - 1.17 mg/dL    GFR Estimate >90 >60 mL/min/1.73m2    Calcium 9.0 8.8 - 10.4 mg/dL    Chloride 104 98 - 107 mmol/L    Glucose 112 (H) 70 - 99 mg/dL    Alkaline Phosphatase 69 40 - 150 U/L    AST 17 0 - 45 U/L    ALT 12 0 - 70 U/L    Protein Total 5.7 (L) 6.4 - 8.3 g/dL    Albumin 3.1 (L) 3.5 - 5.2 g/dL    Bilirubin Total 0.3 <=1.2 mg/dL   CBC with platelets and differential     Status: Abnormal   Result Value Ref Range    WBC Count 6.8 4.0 - 11.0 10e3/uL    RBC Count 4.11 (L) 4.40 - 5.90 10e6/uL    Hemoglobin 11.7 (L) 13.3 - 17.7 g/dL    Hematocrit 35.3 (L) 40.0 - 53.0 %    MCV 86 78 - 100 fL    MCH 28.5 26.5 -  33.0 pg    MCHC 33.1 31.5 - 36.5 g/dL    RDW 14.1 10.0 - 15.0 %    Platelet Count 236 150 - 450 10e3/uL    % Neutrophils 76 %    % Lymphocytes 6 %    % Monocytes 12 %    % Eosinophils 5 %    % Basophils 2 %    % Immature Granulocytes 0 %    NRBCs per 100 WBC 0 <1 /100    Absolute Neutrophils 5.2 1.6 - 8.3 10e3/uL    Absolute Lymphocytes 0.4 (L) 0.8 - 5.3 10e3/uL    Absolute Monocytes 0.8 0.0 - 1.3 10e3/uL    Absolute Eosinophils 0.3 0.0 - 0.7 10e3/uL    Absolute Basophils 0.1 0.0 - 0.2 10e3/uL    Absolute Immature Granulocytes 0.0 <=0.4 10e3/uL    Absolute NRBCs 0.0 10e3/uL   CBC with platelets differential     Status: Abnormal    Narrative    The following orders were created for panel order CBC with platelets differential.  Procedure                               Abnormality         Status                     ---------                               -----------         ------                     CBC with platelets and ...[[id number]]  Abnormal            Final result                 Please view results for these tests on the individual orders.     I reviewed the above labs today.    Imaging:  US Paracentesis with Albumin  Narrative: EXAM:  1. PARACENTESIS  2. ULTRASOUND GUIDANCE  LOCATION: [facility]  DATE: 5/7/2025    INDICATION: Ascites.    PROCEDURE: Informed consent obtained. Time out performed. The abdomen was prepped and draped in a sterile fashion. 10 mL of 1% lidocaine was infused into local soft tissues. A 5 [place] catheter system was introduced into the abdominal ascites under   ultrasound guidance.    9.5 liters of yellowish fluid were removed and sent to lab if requested.    Patient tolerated procedure well.    Ultrasound imaging was obtained and placed in the patient's permanent medical record.  Impression: IMPRESSION:  1.  Status post ultrasound-guided paracentesis.    Reference CPT Code: 29708    I reviewed the above imaging report today.        Assessment and Plan:    Follicular lymphoma, grade 1-2 with retroperitoneal involvement and possible serous involvement (ascites).   - On treatment with Bendamustine and rituximab, has completed 2 of 4 cycles.   - Tolerating well with manageable fatigue.   - Hx of infusion reaction to rituximab w/cycle 1, tolerated cycle 2 better with addition of pre-medications and slower transfusion rate.  - Labs reviewed and discussed. Continues to meet treatment goals, will proceed with cycle 3 without changes. Reviewed increasing protein intake to help with low albumin / total protein levels.  - Continue allopurinol daily.  - Repeat PET 6/2025, followed by review with Dr. Serra.  - Continue provider / ESTELLE visits prior to treatment for review.  - Patient is in agreement with plan.     Recent pulmonary embolism   - Current use of Eliquis BID, w/plan for a minimum of 3-months of treatment beyond chemotherapy  - Tolerating well without significant bleeding, reviewed alarm symptoms    BLE edema  - Currently on furosemide 20 mg BID  - Reviewed use of compression socks, elevation, and limiting salt    Ascites   - Standing orders in place for paracentesis PRN  - Last procedure on 5/7 with 9.5 L of fluid removed, tolerating well  - No s/s of infection at procedural site       Stephanie DUNCAN, [place] [address]        The longitudinal plan of care for the diagnosis(es)/condition(s) as documented were addressed during this visit. Due to the added complexity in care, I will continue to support Bhavesh in the subsequent management and with ongoing continuity of care.    [facility]